# Patient Record
Sex: MALE
[De-identification: names, ages, dates, MRNs, and addresses within clinical notes are randomized per-mention and may not be internally consistent; named-entity substitution may affect disease eponyms.]

---
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
## 2021-01-04 ENCOUNTER — HOSPITAL ENCOUNTER (OUTPATIENT)
Dept: PHYSICAL THERAPY | Facility: CLINIC | Age: 21
Setting detail: THERAPIES SERIES
Discharge: HOME | End: 2021-01-04
Payer: COMMERCIAL

## 2021-01-04 PROCEDURE — 97110 THERAPEUTIC EXERCISES: CPT

## 2021-01-04 PROCEDURE — 97161 PT EVAL LOW COMPLEX 20 MIN: CPT

## 2021-01-04 PROCEDURE — 97140 MANUAL THERAPY 1/> REGIONS: CPT
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
## 2021-01-13 ENCOUNTER — HOSPITAL ENCOUNTER (OUTPATIENT)
Dept: PHYSICAL THERAPY | Facility: CLINIC | Age: 21
Setting detail: THERAPIES SERIES
Discharge: HOME | End: 2021-01-13
Payer: COMMERCIAL

## 2021-01-13 PROCEDURE — 97110 THERAPEUTIC EXERCISES: CPT

## 2021-01-13 PROCEDURE — 97140 MANUAL THERAPY 1/> REGIONS: CPT

## 2021-01-13 NOTE — FLOWSHEET NOTE
[] [facility]) [facility] &  Therapy  [address]  [phone]  F: [phone] [] [address]  [facility]   [address]  P: [phone]  F: [phone] [x] [address] &  Therapy  [address] [facility] [address]  P: [phone]  F: [phone] [] [address]  P: [phone]  F: [phone] [] [address]  [facility]   [address]   [place]: [phone]  F: [phone]      Physical Therapy Daily Treatment Note    Date:  2021  Patient Name:  Oskar Bentley    :  2000  MRN: [id number]  Physician: Amilcar Keenan MD                        Insurance: [employer]  Medical Diagnosis: R Wrist/hand pain                     Rehab Codes: M25.539; M79.643, M25.531, M79.641  Onset Date: 20             Next 's appt: prn  Visit# / total visits: ; Progress note for Medicare patient due at visit 10     Cancels/No Shows: 0    Subjective:    Pain:  [x] Yes  [] No Location: R wrist Pain Rating: (0-10 scale) 2/10  Pain altered Tx:  [] No  [x] Yes  Action: See below  Comments: Pt reports pain is going down. Still hurts when he tries to pronate/supinate all the way. Objective:  Modalities: Manual therapy:25 min: mobs R wrist, R-U, carpals, MFR to R wrist soft tissue, FCU, UCL, with DN to the same. Precautions:  Exercises: See PTA note  Exercise Reps/ Time Weight/ Level Comments                                 Other:      Treatment Charges: Mins Units   []  Modalities     []  Ther Exercise     [x]  Manual Therapy 2 25   []  Ther Activities     []  Aquatics     []  Vasocompression     []  Other     Total Treatment time 2 25       Assessment: [x] Progressing toward goals. Reduced pain noted. See PTA note for ex     [] No change.      [] Other:  [] Patient would continue to benefit from skilled physical therapy services in order to: improve pain levels, improve ROM and strength to return to sports/weight lifting. STG: (to be met in 6-8 treatments)  1. ? Pain: to minimal  2. ? ROM: by 5 degrees or more  3. ? Strength:by 1/2 m. grade? Function:  4. Patient to be independent with home exercise program as demonstrated by performance with correct form without cues.     LTG: (to be met in 12+ treatments)  1. No pain with return to sports, lifting  2. R wrist ROM = to L wrist  3. Wrist strength R = L                    Patient goals: Be able to lift soon    Pt. Education:  [x] Yes  [] No  [] Reviewed Prior HEP/Ed  Method of Education: [x] Verbal  [x] Demo  [] Written  Comprehension of Education:  [x] Verbalizes understanding. [x] Demonstrates understanding. [] Needs review. [] Demonstrates/verbalizes HEP/Ed previously given. Plan: [] Continue current frequency toward long and short term goals.         Time In:1005          Time Out: 1030    Electronically signed by:  Tamika Hardin PT

## 2021-01-13 NOTE — FLOWSHEET NOTE
[] [facility]  [facility]  [address]  [phone]  F: [phone] [] [address]  [facility]   [address]  P: [phone]  F: [phone] [x] [address]   Outpatient Rehabilitation &  Therapy  [address]  P: [phone]  F: [phone] [] [address]  P: [phone]  F: [phone] [] [address]  [facility]   [address]   [place]: [phone]  F: [phone]      Physical Therapy Daily Treatment Note    Date:  2021  Patient Name:  Maricruz Zavala    :  2000  MRN: [id number]   Physician: Kena Boo MD                        Insurance: [employer]  Medical Diagnosis: R Wrist/hand pain                     Rehab Codes: M25.539; M79.643, M25.531, M79.641  Onset Date: 20             Next 's appt: prn  Visit# / total visits: 2/12 (2-3x wk)     Cancels/No Shows:    Subjective:    Pain:  [x] Yes  [] No Location: R wrist/hand Pain Rating: (0-10 scale) 2/10  Pain altered Tx:  [] No  [] Yes  Action:  Comments: Pt states most discomfort is supination. Objective:  Modalities: Christina Dwaine) Manual therapy:25 min: mobs R wrist, R-U, carpals, MFR to R wrist soft tissue, FCU, UCL, with DN to the same.   Precautions:  Exercises:  Exercise Reps/ Time Weight/ Level Comments    Wrist extension stretch 2 5 HEP with HO x   Gentle radial deviation stretch 2 5 HEP with HO x   Finger abd to closed fist 2 5 HEP with HO x   Pron/sup stretch 2 5 HEP with HO x              Wrist ext 2# 10  x   Radial deviation  2# 10  x   Wrist flexion  2# 10 Into neutral only, half curl.  x   Towel twist  10  x   Bicep curl 2#  In neutral only x   Putty  squeeze  2'  Orange (in drawer with name) x                        Other:     Specific Instructions for next treatment: Manual as above and as tolerated with ex progression as tolerated. Treatment Charges: Mins Units   [x]  Modalities: CP 10 0   [x]  Ther Exercise 25 2   []  Manual Therapy     []  Ther Activities     []  Aquatics     []  Vasocompression     []  Other     Total Treatment time 35 2       Assessment: [x] Progressing toward goals. Initiated tx with gentle stretches and cues to maintain in pain free range as to not increase noted inflammation while avoiding full supination. Pt marylou above ex well with cues and assist in maintaining RUE stable to decrease shoulder compensation. No increase in pain at end of tx but added CP for 10' to address and decrease noted inflammation and was instructed to continue ice as PRN at home. Continue with ex listed above and progress in pain free range. [] No change. [] Other:  [] Patient would continue to benefit from skilled physical therapy services in order to resume prior activities. STG: (to be met in 6-8 treatments)  1. ? Pain: to minimal  2. ? ROM: by 5 degrees or more  3. ? Strength:by 1/2 m. grade? Function:  4. Patient to be independent with home exercise program as demonstrated by performance with correct form without cues.     LTG: (to be met in 12+ treatments)  1. No pain with return to sports, lifting  2. R wrist ROM = to L wrist  3. Wrist strength R = L                    Patient goals: Be able to lift soon    Pt. Education:  [x] Yes  [] No  [] Reviewed Prior HEP/Ed  Method of Education: [x] Verbal  [x] Demo  [] Written  Comprehension of Education:  [x] Verbalizes understanding. [x] Demonstrates understanding. [] Needs review. [] Demonstrates/verbalizes HEP/Ed previously given. Plan: [x] Continue current frequency toward long and short term goals.     [] Specific Instructions for subsequent treatments:       Time In: 10:31           Time Out: 11:06    Electronically signed by:  Govind Yao PTA

## 2021-01-18 ENCOUNTER — HOSPITAL ENCOUNTER (OUTPATIENT)
Dept: PHYSICAL THERAPY | Facility: CLINIC | Age: 21
Setting detail: THERAPIES SERIES
Discharge: HOME | End: 2021-01-18
Payer: COMMERCIAL

## 2021-01-18 PROCEDURE — 97110 THERAPEUTIC EXERCISES: CPT

## 2021-01-18 PROCEDURE — 97140 MANUAL THERAPY 1/> REGIONS: CPT

## 2021-01-18 NOTE — FLOWSHEET NOTE
[] [facility] &  Therapy  [address]  [phone]  F: [phone] [] [address]  [employer] [address]   [address]  P: [phone]  F: [phone] [x] [address]   Outpatient Rehabilitation &  Therapy  [address]  P: [phone]  F: [phone] [] [address]  P: [phone]  F: [phone] [] [address]  [facility]   [address]   [place]: [phone]  F: [phone]      Physical Therapy Daily Treatment Note    Date:  2021  Patient Name:  Keagan Kwok    :  2000  MRN: [id number]   Physician: Yuri Huizar MD                        Insurance: [employer]  Medical Diagnosis: R Wrist/hand pain                     Rehab Codes: M25.539; M79.643, M25.531, M79.641  Onset Date: 20             Next 's appt: prn  Visit# / total visits: / (2-3x wk)     Cancels/No Shows:    Subjective:    Pain:  [x] Yes  [] No Location: R wrist/hand Pain Rating: (0-10 scale) 1/10  Pain altered Tx:  [] No  [] Yes  Action:  Comments: Pt states his wrist is getting better. Able to tolerate more movement without pain. Objective:  Modalities: Manual therapy:10 min: mobs R wrist, R-U, carpals, MFR to R wrist soft tissue, FCU, UCL, with DN to the same.   Precautions:  Exercises:  Exercise Reps/ Time Weight/ Level Comments    UBE 10 miin   x   Wrist extension stretch 2 5 HEP with HO x   Gentle radial deviation stretch 2 5 HEP with HO x   Finger abd to closed fist 2 5 HEP with HO x   Pron/sup stretch 2 5 HEP with HO x              Wrist ext 2# 2/10  x   Radial deviation  2# 2/10  x   Wrist flexion  2# 2/10 Into neutral only, half curl.  x   Towel twist  10  x   Bicep curl   In neutral only    Putty  squeeze  2'  Orange (in drawer with name) x Wrist flex/ext with roll up strap 3   x   Supination with TB 15 purple  x   TB -UE horiz abd 15 purple  x   TB - UE flex 15 purple  x   TB UE lat pull down 15 purple  x   Pron with TB  next      Pron/sup  20 2 rings tool x   Baps screws cw/ccw 1ea   x   Clothes pins 3 min   x   Ball walk outs 3x   x   Ball toss 12 1.5/3.5 Single/double x          Other:     Specific Instructions for next treatment: Manual as above and as tolerated with ex progression as tolerated. Treatment Charges: Mins Units   [x]  Modalities: CP 10 0   [x]  Ther Exercise 40 3   [x]  Manual Therapy 10 1   []  Ther Activities     []  Aquatics     []  Vasocompression     []  Other     Total Treatment time 60 4       Assessment: [x] Progressing toward goals. Added all ex per log with good tolerance, except slight pain with ball toss and walk out a few times, but then got better. Pt marylou above ex well with cues and assist in maintaining RUE stable to decrease shoulder compensation. No increase in pain at end of tx but added CP for 10' to address and decrease noted inflammation and was instructed to continue ice as PRN at home. Continue with ex listed above and progress in pain free range. [] No change. [] Other:  [] Patient would continue to benefit from skilled physical therapy services in order to resume prior activities. STG: (to be met in 6-8 treatments)  1. ? Pain: to minimal  2. ? ROM: by 5 degrees or more  3. ? Strength:by 1/2 m. grade? Function:  4. Patient to be independent with home exercise program as demonstrated by performance with correct form without cues.     LTG: (to be met in 12+ treatments)  1. No pain with return to sports, lifting  2. R wrist ROM = to L wrist  3. Wrist strength R = L                    Patient goals: Be able to lift soon    Pt. Education:  [x] Yes  [] No  [] Reviewed Prior HEP/Ed  Method of Education: [x] Verbal  [x] Demo  [] Written  Comprehension of Education:  [x] Verbalizes understanding.   [x]

## 2021-01-21 ENCOUNTER — HOSPITAL ENCOUNTER (OUTPATIENT)
Dept: PHYSICAL THERAPY | Facility: CLINIC | Age: 21
Setting detail: THERAPIES SERIES
Discharge: HOME | End: 2021-01-21
Payer: COMMERCIAL

## 2021-01-21 PROCEDURE — 97140 MANUAL THERAPY 1/> REGIONS: CPT

## 2021-01-21 PROCEDURE — 97110 THERAPEUTIC EXERCISES: CPT

## 2021-01-21 NOTE — FLOWSHEET NOTE
[] [facility]) [facility] &  Therapy  [address]  [phone]  F: [phone] [] [address]  [employer] [address]   [address]  P: [phone]  F: [phone] [x] [address] &  Therapy  [address] [facility]  P: [phone]  F: [phone] [] [address]  P: [phone]  F: [phone] [] [address]  [facility]   [address]   [place]: [phone]  F: [phone]      Physical Therapy Daily Treatment Note    Date:  2021  Patient Name:  Leroy Mallory    :  2000  MRN: [id number]   Physician: Arturo Figueroa MD                        Insurance: [facility] Medicaid  Medical Diagnosis: R Wrist/hand pain                     Rehab Codes: M25.539; M79.643, M25.531, M79.641  Onset Date: 20             Next 's appt: prn  Visit# / total visits:  (2-3x wk)     Cancels/No Shows:    Subjective:    Pain:  [x] Yes  [] No Location: R wrist/hand Pain Rating: (0-10 scale) 1-2/10  Pain altered Tx:  [] No  [] Yes  Action:  Comments: Pt states his wrist is getting better. Able to tolerate more movement without pain. Objective:  Modalities: Manual therapy:10 min: mobs R wrist, R-U, carpals, MFR to R wrist soft tissue, FCU, UCL, with DN to the same. Precautions:  Exercises: 50  Exercise Reps/ Time Weight/ Level Comments    UBE 10 miin   x   Wrist extension stretch 2 5 HEP with HO x   Gentle radial deviation stretch 2 5 HEP with HO x   Finger abd to closed fist 2 5 HEP with HO x   Pron/sup stretch 2 5 HEP with HO x              Wrist ext 2# 2/10     Radial deviation  2# 2/10     Wrist flexion  2# 2/10 Into neutral only, half curl.      Towel twist  10     Bicep curl   In neutral only    Putty  squeeze  2'  Orange (in drawer with name) - given for HEP 1/21 x   Wrist flex/ext with roll up rope 3 2.5#  x   Supination with TB 1/10 blue  x   TB -UE horiz abd 2/10 purple  x   TB - UE flex 2/10 purple  x   TB sh ER 10 blue  x   TB UE lat pull down 2/10 purple  x   Pron with TB  1/10 blue  x   Pron/sup  20 2 rings tool x   Baps screws cw/ccw 1ea      Clothes pins 3 min      Ball walk outs 3x   x   Ball toss 15 1.5/4.5 Single/double x   I Y T EXT 2/12 5  x   Shoulder IR pulley system 15 15 Pulley wt x   Bicep curls pulley system 15 15  x   Tricep pulley sys 15 45  x   Lat pull down machine 15 40  x                                      Other:     Specific Instructions for next treatment: Manual as above and as tolerated with ex progression as tolerated. Treatment Charges: Mins Units   [x]  Modalities: CP 10 0   [x]  Ther Exercise 50 3   [x]  Manual Therapy 10 1   []  Ther Activities     []  Aquatics     []  Vasocompression     []  Other     Total Treatment time 70 4       Assessment: [x] Progressing toward goals. Added all ex per log with good tolerance, except slight pain with bicep curls, but adjusted  which was helpful. Pt marylou above ex well with cues and assist in maintaining RUE stable to decrease shoulder compensation. No increase in pain at end of tx but added CP for 10' to address and decrease noted inflammation and was instructed to continue ice as PRN at home. Continue with ex listed above and progress in pain free range. [] No change. [] Other:  [] Patient would continue to benefit from skilled physical therapy services in order to resume prior activities. STG: (to be met in 6-8 treatments)  1. ? Pain: to minimal  2. ? ROM: by 5 degrees or more  3. ? Strength:by 1/2 m. grade? Function:  4. Patient to be independent with home exercise program as demonstrated by performance with correct form without cues.     LTG: (to be met in 12+ treatments)  1. No pain with return to sports, lifting  2. R wrist ROM = to L wrist  3.  Wrist strength R = L                    Patient goals: Be able to lift soon    Pt. Education:  [x] Yes  [] No  [] Reviewed Prior HEP/Ed  Method of Education: [x] Verbal  [x] Demo  [] Written  Comprehension of Education:  [x] Verbalizes understanding. [x] Demonstrates understanding. [] Needs review. [] Demonstrates/verbalizes HEP/Ed previously given. Plan: [x] Continue current frequency toward long and short term goals.     [] Specific Instructions for subsequent treatments:       Time In: 0930          Time Out: [address]    Electronically signed by:  Kelvin Mera PT

## 2021-01-25 ENCOUNTER — HOSPITAL ENCOUNTER (OUTPATIENT)
Dept: PHYSICAL THERAPY | Facility: CLINIC | Age: 21
Setting detail: THERAPIES SERIES
Discharge: HOME | End: 2021-01-25
Payer: COMMERCIAL

## 2021-01-25 PROCEDURE — 97140 MANUAL THERAPY 1/> REGIONS: CPT

## 2021-01-25 PROCEDURE — 97110 THERAPEUTIC EXERCISES: CPT

## 2021-01-25 NOTE — FLOWSHEET NOTE
[] [facility]) [facility] &  Therapy  [address]  [phone]  F: [phone] [] [address]  [employer] [address]   [address]  P: [phone]  F: [phone] [x] [address] &  Therapy  [address]  P: [phone]  F: [phone] [] [address]  P: [phone]  F: [phone] [] [address]  [facility]   [address]   [place]: [phone]  F: [phone]      Physical Therapy Daily Treatment Note    Date:  2021  Patient Name:  Rachel Hastings    :  2000  MRN: [id number]   Physician: Cindy Patterson MD                        Insurance: [employer]  Medical Diagnosis: R Wrist/hand pain                     Rehab Codes: M25.539; M79.643, M25.531, M79.641  Onset Date: 20             Next 's appt: prn  Visit# / total visits:  (2-3x wk)     Cancels/No Shows:    Subjective:  Pain altered Tx:  [] No  [] Yes  Action:  Pain:  [x] Yes  [] No Location: R wrist/hand Pain Rating: (0-10 scale) 1-2/10    Comments: Pt states his wrist is continuing to get better. Pain is still there when trying to go into full ROM (supination), however. Objective:  Modalities: Manual therapy:25 min: mobs R wrist, R-U, carpals, MFR to R wrist soft tissue, FCU, UCL, with DN to the same. Precautions:  Exercises: See PTA note  Exercise Reps/ Time Weight/ Level Comments    UBE 10 miin   x   Wrist extension stretch 2 5 HEP with HO x   Gentle radial deviation stretch 2 5 HEP with HO x   Finger abd to closed fist 2 5 HEP with HO x   Pron/sup stretch 2 5 HEP with HO x              Wrist ext 2# 2/10     Radial deviation  2# 2/10     Wrist flexion  2# 2/10 Into neutral only, half curl.      Towel twist  10     Bicep curl   In neutral only Putty  squeeze  2'  Orange (in drawer with name) - given for HEP 1/21 x   Wrist flex/ext with roll up rope 3 2.5#  x   Supination with TB 1/10 blue  x   TB -UE horiz abd 2/10 purple  x   TB - UE flex 2/10 purple  x   TB sh ER 10 blue  x   TB UE lat pull down 2/10 purple  x   Pron with TB  1/10 blue  x   Pron/sup  20 2 rings tool x   Baps screws cw/ccw 1ea      Clothes pins 3 min      Ball walk outs 3x   x   Ball toss 15 1.5/4.5 Single/double x   I Y T EXT 2/12 5  x   Shoulder IR pulley system 15 15 Pulley wt x   Bicep curls pulley system 15 15  x   Tricep pulley sys 15 45  x   Lat pull down machine 15 40  x                                      Other:     Specific Instructions for next treatment: Manual as above and as tolerated with ex progression as tolerated. Treatment Charges: Mins Units   []  Modalities: CP     []  Ther Exercise     [x]  Manual Therapy 25 2   []  Ther Activities     []  Aquatics     []  Vasocompression     []  Other     Total Treatment time 25 2       Assessment: [x] Progressing toward goals. Pain noted with full pronation, however other ROM improving. Pain noted today in a new location between the distal ulna and radius dorsally. See PTA note for ex. [] No change. [] Other:  [] Patient would continue to benefit from skilled physical therapy services in order to resume prior activities. STG: (to be met in 6-8 treatments)  1. ? Pain: to minimal  2. ? ROM: by 5 degrees or more  3. ? Strength:by 1/2 m. grade? Function:  4. Patient to be independent with home exercise program as demonstrated by performance with correct form without cues.     LTG: (to be met in 12+ treatments)  1. No pain with return to sports, lifting  2. R wrist ROM = to L wrist  3. Wrist strength R = L                    Patient goals: Be able to lift soon    Pt.  Education:  [x] Yes  [] No  [] Reviewed Prior HEP/Ed  Method of Education: [x] Verbal  [x] Demo  [] Written  Comprehension of Education:  [x] [employer] understanding. [x] Demonstrates understanding. [] Needs review. [] Demonstrates/verbalizes HEP/Ed previously given. Plan: [x] Continue current frequency toward long and short term goals.     [] Specific Instructions for subsequent treatments:       Time In: 1005          Time Out: 1030    Electronically signed by:  Enrico Robins PT

## 2021-01-25 NOTE — FLOWSHEET NOTE
[] [facility] &  Therapy  [address]  [phone]  F: [phone] [] [address]  [employer] [address]   [address]  P: [phone]  F: [phone] [x] [address] &  Therapy  [address]  P: [phone]  F: [phone] [] [address]  P: [phone]  F: [phone] [] [address]  [facility]   [address]   [place]: [phone]  F: [phone]      Physical Therapy Daily Treatment Note    Date:  2021  Patient Name:  eSan Camacho    :  2000  MRN: [id number]   Physician: Chong Corona MD                        Insurance: [employer]  Medical Diagnosis: R Wrist/hand pain                     Rehab Codes: M25.539; M79.643, M25.531, M79.641  Onset Date: 20             Next 's appt: prn  Visit# / total visits:  (2-3x wk)     Cancels/No Shows:    Subjective:    Pain:  [x] Yes  [] No Location: R wrist/hand Pain Rating: (0-10 scale) 1-2/10  Pain altered Tx:  [] No  [] Yes  Action:  Comments: Pt arrived reporting R writs/hand is feeling better after needles. Objective:  Modalities: Manual therapy:10 min: mobs R wrist, R-U, carpals, MFR to R wrist soft tissue, FCU, UCL, with DN to the same. Precautions:  Exercises: 50  Exercise Reps/ Time Weight/ Level Comments    UBE 10 miin   x   Wrist extension stretch 2 5 HEP with HO x   Gentle radial deviation stretch 2 5 HEP with HO x   Finger abd to closed fist 2 5 HEP with HO x   Pron/sup stretch 2 5 HEP with HO x              Wrist ext 2# 2/10     Radial deviation  2# 2/10     Wrist flexion  2# 2/10 Into neutral only, half curl.      Towel twist  10     Bicep curl   In neutral only    Putty  squeeze  2'  Orange (in drawer with name) - given for HEP  Wrist flex/ext with roll up rope 3 2.5#  x   Supination with TB 2/10 blue  x   TB -UE horiz abd 3/10 purple  x   TB - UE flex 3/10 purple  x   TB sh ER 2/10 blue  x   TB UE lat pull down 3/10 purple  x   Pron with TB  2/10 blue  x   Pron/sup  20 2 rings tool x   Baps screws cw/ccw 1ea      Clothes pins 3 min      Ball walk outs 3x      Ball toss 15 1.5/4.5 Single/double    I Y T EXT 2/12 5  x   Shoulder IR pulley system 15 15 Pulley wt    Bicep curls pulley system 15 15     Tricep pulley sys 15 45     Lat pull down machine 15 40  x                                      Other:     Specific Instructions for next treatment: Manual as above and as tolerated with ex progression as tolerated. Treatment Charges: Mins Units   []  Modalities: CP     [x]  Ther Exercise 25 2   []  Manual Therapy     []  Ther Activities     []  Aquatics     []  Vasocompression     []  Other     Total Treatment time 25 2       Assessment: [x] Progressing toward goals. Able to progress pt with increasing reps for exercises as stated above with good tolerance. Pt having increase muscle fatigue with increased reps, but no increase in pain. Pt displayed good form throughout treatment this date. Continue to progress as marylou. [] No change. [] Other:  [] Patient would continue to benefit from skilled physical therapy services in order to resume prior activities. STG: (to be met in 6-8 treatments)  1. ? Pain: to minimal  2. ? ROM: by 5 degrees or more  3. ? Strength:by 1/2 m. grade? Function:  4. Patient to be independent with home exercise program as demonstrated by performance with correct form without cues.     LTG: (to be met in 12+ treatments)  1. No pain with return to sports, lifting  2. R wrist ROM = to L wrist  3. Wrist strength R = L                    Patient goals: Be able to lift soon    Pt.  Education:  [x] Yes  [] No  [] Reviewed Prior HEP/Ed  Method of Education: [x] Verbal  [x] Demo  [] Written  Comprehension of Education:  [x] Verbalizes understanding. [x] Demonstrates understanding. [] Needs review. [] Demonstrates/verbalizes HEP/Ed previously given. Plan: [x] Continue current frequency toward long and short term goals.     [] Specific Instructions for subsequent treatments:       Time In: 10:25 am          Time Out: 11:00 am    Electronically signed by:  Jim Cat PTA

## 2021-02-02 ENCOUNTER — HOSPITAL ENCOUNTER (OUTPATIENT)
Dept: PHYSICAL THERAPY | Facility: CLINIC | Age: 21
Setting detail: THERAPIES SERIES
Discharge: HOME | End: 2021-02-02
Payer: COMMERCIAL

## 2021-02-02 PROCEDURE — 97140 MANUAL THERAPY 1/> REGIONS: CPT

## 2021-02-02 PROCEDURE — 97110 THERAPEUTIC EXERCISES: CPT

## 2021-02-02 NOTE — FLOWSHEET NOTE
twist  10     Bicep curl   In neutral only    Putty  squeeze  2'  Orange (in drawer with name) - given for HEP 1/21    Wrist flex/ext with roll up rope 3 2.5#     Supination with TB 2/10 blue  x   TB -UE horiz abd 2/10 purple  x   TB - UE flex 2/10 purple  x   TB sh ER 2/10 blue  x   TB UE lat pull down 2/10 purple  x   Pron with TB  1/10 blue  x   Pron/sup  20 2 rings tool x   Baps screws cw/ccw 1ea      Clothes pins 3 min      Ball walk outs 3x      Ball toss 20 3.5/4.5 Single/double x   I Y T EXT 2/15 5  x   Shoulder IR pulley system 15 15 Pulley wt    Bicep curls pulley system 15 15     Tricep pulley sys 15 45     Lat pull down machine 15 40                                        Other:     Specific Instructions for next treatment: Manual as above and as tolerated with ex progression as tolerated. Treatment Charges: Mins Units   [x]  Modalities: CP 10 NC   [x]  Ther Exercise 30 2   [x]  Manual Therapy 25 2   []  Ther Activities     []  Aquatics     []  Vasocompression     [x]  Other     Total Treatment time 65 4       Assessment: [x] Progressing toward goals. Some pain noted with catching the 3.5 ball with one hand and continued with full range flexion in a supinated position at the as he had in his previous session. Mobs and ice helpful for reducing soreness post-treatment. [] No change. [] Other:  [] Patient would continue to benefit from skilled physical therapy services in order to resume prior activities. STG: (to be met in 6-8 treatments)  1. ? Pain: to minimal  2. ? ROM: by 5 degrees or more  3. ? Strength:by 1/2 m. grade? Function:  4. Patient to be independent with home exercise program as demonstrated by performance with correct form without cues.     LTG: (to be met in 12+ treatments)  1. No pain with return to sports, lifting  2. R wrist ROM = to L wrist  3. Wrist strength R = L                    Patient goals: Be able to lift soon    Pt.  Education:  [x] Yes  [] No  [] Reviewed Prior HEP/Ed  Method of Education: [x] Verbal  [x] Demo  [] Written  Comprehension of Education:  [x] Verbalizes understanding. [x] Demonstrates understanding. [] Needs review. [] Demonstrates/verbalizes HEP/Ed previously given. Plan: [x] Continue current frequency toward long and short term goals.     [] Specific Instructions for subsequent treatments:       Time In: 1100          Time Out: 1210    Electronically signed by:  Shari Miller PT

## 2021-02-09 ENCOUNTER — HOSPITAL ENCOUNTER (OUTPATIENT)
Dept: PHYSICAL THERAPY | Facility: CLINIC | Age: 21
Setting detail: THERAPIES SERIES
Discharge: HOME | End: 2021-02-09
Payer: COMMERCIAL

## 2021-02-09 PROCEDURE — 97140 MANUAL THERAPY 1/> REGIONS: CPT

## 2021-02-09 PROCEDURE — 97110 THERAPEUTIC EXERCISES: CPT

## 2021-02-09 NOTE — FLOWSHEET NOTE
[] [facility] &  Therapy  [address]  [phone]  F: [phone] [] [address]  [facility] [place] [facility]   [address]  P: [phone]  F: [phone] [x] [address]   Outpatient Rehabilitation &  Therapy  [address] [facility]  P: [phone]  F: [phone] [] [address]  P: [phone]  F: [phone] [] [address]  [facility]   [address]   [place]: [phone]  F: [phone]      Physical Therapy Daily Treatment Note    Date:  2021  Patient Name:  Willy Villatoro    :  2000  MRN: [id number]   Physician: Larry Grayson MD                        Insurance: [employer]  Medical Diagnosis: R Wrist/hand pain                     Rehab Codes: M25.539; M79.643, M25.531, M79.641  Onset Date: 20             Next 's appt: prn  Visit# / total visits:  (2-3x wk)     Cancels/No Shows: 0    Subjective:  Pain altered Tx:  [] No  [x] Yes  Action:See below  Pain:  [x] Yes  [] No Location: R wrist/hand Pain Rating: (0-10 scale) 0-1/10    Comments: Pt reporting no pain at rest, but when performing supination has some discomfort.  Feels good after manual.      Objective:  Modalities: Manual therapy:25 min: mobs R wrist, R-U, carpals, MFR to R wrist soft tissue, FCU, UCL, with DN to the same, wrist mobs at ulna/rad, triquetrium/lunate  Precautions:  Exercises:   Exercise Reps/ Time Weight/ Level Comments    bike 6 miin   x   Wrist extension stretch 2 5 HEP with HO x   Gentle radial deviation stretch 2 5 HEP with HO x   Finger abd to closed fist 2 5 HEP with HO x   Pron/sup stretch 2 5 HEP with HO x              Wrist ext 3# 2/10  x   Radial deviation  3# 2/10  x   Wrist flexion  3# 2/10 Into neutral only, half curl.  x   Towel twist  10     Bicep curl   In neutral only    Putty  squeeze  2'  Orange (in drawer with name) - given for HEP 1/21    Wrist flex/ext with roll up rope 3 2.5#     Supination with TB 2/10 purple  x   TB -UE horiz abd 2/10 purple  x   TB - UE flex 2/10 purple  x   TB sh ER 2/10 purple  x   TB UE lat pull down 2/10 purple  x   Pron with TB  1/10 purple  x   Pron/sup  30 2 rings tool x   Baps screws cw/ccw 1ea      Clothes pins 3 min      Ball walk outs 3x      Ball toss 20 3.5/4.5 Single/double x   I Y T EXT 2/15 5  x   Shoulder IR pulley system 15 15 Pulley wt    Bicep curls pulley system 15 15     Tricep pulley sys 15 45     Lat pull down machine 15 40                                        Other:     Specific Instructions for next treatment: Manual as above and as tolerated with ex progression as tolerated. Treatment Charges: Mins Units   [x]  Modalities: CP 10 NC   [x]  Ther Exercise 30 2   []  Manual Therapy     []  Ther Activities     []  Aquatics     []  Vasocompression     []  Other     Total Treatment time         Assessment: [x] Progressing toward goals. Able to increase reps for exercises as well as increase resistance to purple for all exercises. No increase in pain with exercises just having muscle fatigue post treatment. Ended with CP for decreased symptoms. [] No change. [] Other:  [] Patient would continue to benefit from skilled physical therapy services in order to resume prior activities. STG: (to be met in 6-8 treatments)  1. ? Pain: to minimal  2. ? ROM: by 5 degrees or more  3. ? Strength:by 1/2 m. grade? Function:  4. Patient to be independent with home exercise program as demonstrated by performance with correct form without cues.     LTG: (to be met in 12+ treatments)  1. No pain with return to sports, lifting  2. R wrist ROM = to L wrist  3. Wrist strength R = L                    Patient goals: Be able to lift soon    Pt.  Education:  [x] Yes  [] No  [] Reviewed Prior HEP/Ed  Method of Education:

## 2021-02-09 NOTE — FLOWSHEET NOTE
x   Radial deviation  3# 1/10  x   Wrist flexion  3# 1/10 Into neutral only, half curl.  x   Towel twist  10     Bicep curl   In neutral only    Putty  squeeze  2'  Orange (in drawer with name) - given for HEP 1/21    Wrist flex/ext with roll up rope 3 2.5#     Supination with TB 2/10 blue  x   TB -UE horiz abd 2/10 purple  x   TB - UE flex 2/10 purple  x   TB sh ER 2/10 blue  x   TB UE lat pull down 2/10 purple  x   Pron with TB  1/10 blue  x   Pron/sup  20 2 rings tool x   Baps screws cw/ccw 1ea      Clothes pins 3 min      Ball walk outs 3x      Ball toss 20 3.5/4.5 Single/double x   I Y T EXT 2/15 5  x   Shoulder IR pulley system 15 15 Pulley wt    Bicep curls pulley system 15 15     Tricep pulley sys 15 45     Lat pull down machine 15 40                                        Other:     Specific Instructions for next treatment: Manual as above and as tolerated with ex progression as tolerated. Treatment Charges: Mins Units   []  Modalities: CP     []  Ther Exercise     [x]  Manual Therapy 25 2   []  Ther Activities     []  Aquatics     []  Vasocompression     []  Other     Total Treatment time 25 2       Assessment: [x] Progressing toward goals. Pt continues to report improvement, but still having trouble with full sup/pron and now ulnar deviation. Manual therapy helpful for improving motion and relieving pain. [] No change. [] Other:  [] Patient would continue to benefit from skilled physical therapy services in order to resume prior activities. STG: (to be met in 6-8 treatments)  1. ? Pain: to minimal  2. ? ROM: by 5 degrees or more  3. ? Strength:by 1/2 m. grade? Function:  4. Patient to be independent with home exercise program as demonstrated by performance with correct form without cues.     LTG: (to be met in 12+ treatments)  1. No pain with return to sports, lifting  2. R wrist ROM = to L wrist  3. Wrist strength R = L                    Patient goals: Be able to lift soon    Pt. Education:  [x] Yes  [] No  [] Reviewed Prior HEP/Ed  Method of Education: [x] Verbal  [x] Demo  [] Written  Comprehension of Education:  [x] Verbalizes understanding. [x] Demonstrates understanding. [] Needs review. [] Demonstrates/verbalizes HEP/Ed previously given. Plan: [x] Continue current frequency toward long and short term goals.     [] Specific Instructions for subsequent treatments:       Time In: 1030          Time Out: 1100    Electronically signed by:  Mis Cooper PT

## 2021-02-15 NOTE — FLOWSHEET NOTE
[] [address]  [address]    [phone]  F: [phone]   [] [address]  [facility] 36   [address]  P: [phone]  F: [phone]  [] [address]  [address] [facility]  P: [phone]  F: [phone] [] [address]  P: [phone]  F: [phone]  [] [address]  [address] [facility] [address]   [address]   [place]: [phone]  F: [phone]   [] [address]  [place]: [phone]   F: [phone]     Physical Therapy Cancel/No Show note    Date: 2/15/2021  Patient: Radha Lopez  : 2000  MRN: [id number]    Cancels/No Shows to date: 1  For today's appointment patient:    [x]  Cancelled    [] Rescheduled appointment    [] No-show     Reason given by patient:    []  Patient ill    []  Conflicting appointment    [] No transportation      [] Conflict with work    [] No reason given    [x] Weather related    [] COVID-19    [] Other:      Comments:        [] Next appointment was confirmed    Electronically signed by: Marquis Patel

## 2021-02-16 ENCOUNTER — HOSPITAL ENCOUNTER (OUTPATIENT)
Dept: PHYSICAL THERAPY | Facility: CLINIC | Age: 21
Setting detail: THERAPIES SERIES
Discharge: HOME | End: 2021-02-16
Payer: COMMERCIAL

## 2021-02-23 ENCOUNTER — HOSPITAL ENCOUNTER (OUTPATIENT)
Dept: PHYSICAL THERAPY | Facility: CLINIC | Age: 21
Setting detail: THERAPIES SERIES
Discharge: HOME | End: 2021-02-23
Payer: COMMERCIAL

## 2021-02-23 PROCEDURE — 97140 MANUAL THERAPY 1/> REGIONS: CPT

## 2021-02-23 PROCEDURE — 97110 THERAPEUTIC EXERCISES: CPT

## 2021-02-23 NOTE — FLOWSHEET NOTE
[] [facility]) [facility] &  Therapy  [address]  [phone]  F: [phone] [] [address]  [facility] [address]   [address]  P: [phone]  F: [phone] [x] [address] &  Therapy  [address] [facility]  P: [phone]  F: [phone] [] [address]  P: [phone]  F: [phone] [] [address]  [facility]   [address]   [place]: [phone]  F: [phone]      Physical Therapy Daily Treatment Note    Date:  2021  Patient Name:  Zulma Varghese    :  2000  MRN: [id number]   Physician: Loretta Hernandez MD                        Insurance: [facility] Medicaid  Medical Diagnosis: R Wrist/hand pain                     Rehab Codes: M25.539; M79.643, M25.531, M79.641  Onset Date: 20             Next 's appt: prn  Visit# / total visits:  (2-3x wk)     Cancels/No Shows: 0    Subjective:  Pain altered Tx:  [] No  [x] Yes  Action:See below  Pain:  [x] Yes  [] No Location: R wrist/hand Pain Rating: (0-10 scale) 0/10    Comments: Pt reports he is getting better, and noticing improved ROM overall. Only slight soreness at end range supination with wrist flex. Getting a little lifting in, but going very gradual with it.      Objective:  Modalities: Manual therapy:20 min: mobs R wrist, R-U, carpals, MFR to R wrist soft tissue, FCU, UCL, with DN to the same, wrist mobs at ulna/rad, triquetrium/lunate TFCC, DRUL  Precautions:  Exercises: SEE PTA note  Exercise Reps/ Time Weight/ Level Comments    UBE 10 miin   x   Wrist extension stretch 2 5 HEP with HO x   Gentle radial deviation stretch 2 5 HEP with HO x   Finger abd to closed fist 2 5 HEP with HO x   Pron/sup stretch 2 5 HEP with HO x              Wrist ext 3# 1/10  x   Radial deviation  3# 1/10  x   Wrist flexion  3# 1/10 Into neutral only, half curl.  x   Towel twist  10     Bicep curl   In neutral only    Putty  squeeze  HEP  Orange (in drawer with name) - given for HEP 1/21    Wrist flex/ext with roll up rope 3 2.5#     Supination with TB 2/10 purple  x   TB -UE horiz abd 2/15 purple  x   TB - UE flex 2/15 purple  x   TB sh ER 2/15 blue  x   TB UE lat pull down 2/15 blue  x   Pron with TB  2/15 blue  x   Pron/sup  20 2 rings tool    Baps screws cw/ccw 1ea      Clothes pins 3 min      Ball walk outs 3x      Ball toss 20 3.5/4.5 Single/double x   I Y T EXT 2/15 5  x   Shoulder IR pulley system 15 15 Pulley wt    Bicep curls pulley system 15 15     Tricep pulley sys 15 45     Lat pull down machine 15 40                                        Other:     Specific Instructions for next treatment: Manual as above and as tolerated with ex progression as tolerated. Treatment Charges: Mins Units   [x]  Modalities: CP 10 NC   [x]  Ther Exercise 40 3   [x]  Manual Therapy 20 1   []  Ther Activities     []  Aquatics     []  Vasocompression     [x]  Other     Total Treatment time 70 4       Assessment: [x] Progressing toward goals. Noted pain with wrist flex ex today. Pt continues to report improvement. Will continue to work on [employer] training as tolerated. Manual therapy helpful for improving motion and relieving pain. [] No change. [] Other:  [] Patient would continue to benefit from skilled physical therapy services in order to resume prior activities. STG: (to be met in 6-8 treatments)  1. ? Pain: to minimal  2. ? ROM: by 5 degrees or more  3. ? Strength:by 1/2 m. grade? Function:  4. Patient to be independent with home exercise program as demonstrated by performance with correct form without cues.     LTG: (to be met in 12+ treatments)  1. No pain with return to sports, lifting  2. R wrist ROM = to L wrist  3.  Wrist strength R = L                    Patient goals: Be able to lift soon    Pt. Education:  [x] Yes  [] No  [] Reviewed Prior HEP/Ed  Method of Education: [x] Verbal  [x] Demo  [] Written  Comprehension of Education:  [x] Verbalizes understanding. [x] Demonstrates understanding. [] Needs review. [] Demonstrates/verbalizes HEP/Ed previously given. Plan: [x] Continue current frequency toward long and short term goals.     [] Specific Instructions for subsequent treatments:       Time In: 0900         Time Out: 1010    Electronically signed by:  Musa Hayes PT

## 2021-03-09 ENCOUNTER — HOSPITAL ENCOUNTER (OUTPATIENT)
Dept: PHYSICAL THERAPY | Facility: CLINIC | Age: 21
Setting detail: THERAPIES SERIES
Discharge: HOME | End: 2021-03-09
Payer: COMMERCIAL

## 2021-03-09 PROCEDURE — 97140 MANUAL THERAPY 1/> REGIONS: CPT

## 2021-03-09 PROCEDURE — 97110 THERAPEUTIC EXERCISES: CPT

## 2021-03-09 NOTE — FLOWSHEET NOTE
[] [facility]) [facility] &  Therapy  [address]  [phone]  F: [phone] [] [address]   [address]  P: [phone]  F: [phone] [x] [address] &  Therapy  [address] [facility]  P: [phone]  F: [phone] [] [address]  P: [phone]  F: [phone] [] [address]  [facility]   [address]   [place]: [phone]  F: [phone]      Physical Therapy Daily Treatment Note    Date:  3/9/2021  Patient Name:  Owen Lunsford    :  2000  MRN: [id number]   Physician: Bal Burns MD                        Insurance: [facility] Medicaid  Medical Diagnosis: R Wrist/hand pain                     Rehab Codes: M25.539; M79.643, M25.531, M79.641  Onset Date: 20             Next 's appt: prn  Visit# / total visits:  (2-3x wk)     Cancels/No Shows: 0    Subjective:  Pain altered Tx:  [] No  [x] Yes  Action:See below  Pain:  [x] Yes  [] No Location: R wrist/hand Pain Rating: (0-10 scale) 0-1/10    Comments: Pt reports he is getting better, and noticing improved ROM overall. Only slight soreness mid range wrist flex. Getting a little lifting in and able to shoot a basketball now.  Slight soreness after shooting around 1.5 hours    Objective:  Modalities: Manual therapy:10 min: mobs R wrist, R-U, carpals, MFR to R wrist soft tissue, FCU, UCL, with DN to the same, wrist mobs at ulna/rad, triquetrium/lunate TFCC, DRUL  Precautions:  Exercise Reps/ Time Weight/ Level Comments     bike 5 miin     x   Wrist extension stretch 2 5 HEP with HO x   Gentle radial deviation stretch 2 5 HEP with HO x   Finger abd to closed fist 2 5 HEP with HO x   Pron/sup stretch 2 5 HEP with HO x               Wrist ext 3# 2/10   x Radial deviation  3# 2/10   x   Wrist flexion  3# 2/10 Into neutral only, half curl.  x   Towel twist   10       Bicep curl     In neutral only     Putty  squeeze  2'   Orange (in drawer with name) - given for HEP 1/21     Wrist flex/ext with roll up rope 3 2.5#       Supination with TB 2/12 purple   x   TB -UE horiz abd 2/12 purple   x   TB - UE flex 2/12 purple   x   TB sh ER 2/12 purple   x   TB UE lat pull down 2/12 purple   x   Pron with TB  1/10 purple   x   Pron/sup  30 2 rings tool x   Baps screws cw/ccw 1ea         Clothes pins 3 min         Ball walk outs 3x         Ball toss 20 3.5/4.5 Single/double x   I Y T EXT 2/15 5   x   Shoulder IR pulley system 15 15 Pulley wt     Bicep curls pulley system 15 15       Tricep pulley sys 15 45       Lat pull down machine 15 40        Clothes pins pinch grasp  3 min  green and blue  thumb, digits 4 and 5  x                                                     Other:     Specific Instructions for next treatment: Manual as above and as tolerated with ex progression as tolerated. Treatment Charges: Mins Units   [x]  Modalities: CP 10 NC   [x]  Ther Exercise 50 3   [x]  Manual Therapy 10 1   []  Ther Activities     []  Aquatics     []  Vasocompression     [x]  Other     Total Treatment time 70 4       Assessment: [x] Progressing toward goals. Noted mild pain with full wrist flex with weight. Modified to partial ROM with good tolerance. Pt continues to report improvement. Will continue to work on [employer] training and basketball at home. Follow up in 1 month with any issues. . Manual therapy helpful for improving motion and relieving pain. [] No change. [] Other:  [] Patient would continue to benefit from skilled physical therapy services in order to resume prior activities. STG: (to be met in 6-8 treatments) - achieved  1. ? Pain: to minimal  2. ? ROM: by 5 degrees or more  3. ? Strength:by 1/2 m. grade? Function:  4.  Patient to be independent with home exercise program as demonstrated by performance with correct form without cues.     LTG: (to be met in 12+ treatments)  1. No pain with return to sports, lifting - partially achieved. 2. R wrist ROM = to L wrist - achieved  3. Wrist strength R = L achieved                    Patient goals: Be able to lift soon    Pt. Education:  [x] Yes  [] No  [] Reviewed Prior HEP/Ed  Method of Education: [x] Verbal  [x] Demo  [] Written  Comprehension of Education:  [x] Verbalizes understanding. [x] Demonstrates understanding. [] Needs review. [] Demonstrates/verbalizes HEP/Ed previously given. Plan: [x] Follow up in 1 month for possible D/C.     [] Specific Instructions for subsequent treatments:       Time In: 0900         Time Out: 1010    Electronically signed by:  Oskar Nguyễn PT

## 2021-04-08 ENCOUNTER — HOSPITAL ENCOUNTER (OUTPATIENT)
Dept: PHYSICAL THERAPY | Facility: CLINIC | Age: 21
Setting detail: THERAPIES SERIES
Discharge: HOME | End: 2021-04-08
Payer: COMMERCIAL

## 2021-04-08 PROCEDURE — 97140 MANUAL THERAPY 1/> REGIONS: CPT

## 2021-04-08 PROCEDURE — 97110 THERAPEUTIC EXERCISES: CPT

## 2021-04-08 NOTE — FLOWSHEET NOTE
[] [facility]) [facility] &  Therapy  [address]  [phone]  F: [phone] [] [address]   [address]  P: [phone]  F: [phone] [x] [address] &  Therapy  [address] [facility]  P: [phone]  F: [phone] [] [address]  P: [phone]  F: [phone] [] [address]  [facility]   [address]   [place]: [phone]  F: [phone]      Physical Therapy Daily Treatment Note/Re-Eval    Date:  2021  Patient Name:  Remy Vega    :  2000  MRN: [id number]   Physician: Alanna Hare MD                        Insurance: [employer]  Medical Diagnosis: R Wrist/hand pain                     Rehab Codes: M25.539; M79.643, M25.531, M79.641  Onset Date: 20             Next 's appt: prn  Visit# / total visits:  (2-3x wk)     Cancels/No Shows: 0    Subjective:  Pain altered Tx:  [] No  [x] Yes  Action:See below  Pain:  [x] Yes  [] No Location: R wrist/hand Pain Rating: (0-10 scale) 0-1/10    Comments: Pt reports he is getting better, and noticing improved ROM overall. Just started lifting a week and a half ago again after hurting his back playing basketball a while back. Went through some PT for that, and seems to be getting better. Last time he lifted was yesterday and it feels pretty sore. Wants to hold off on most ex due to lifting yesterday. Reports mild pain with full wrist flex with weight with bicep curls.     Objective: Full ROM and strength noted, except mild pain with full wrist flexion with resistance and occasional clicking with supination and flex/ext   Modalities: Manual therapy:45min: mobs R wrist, R-U, carpals, MFR to R wrist soft tissue, FCU, UCL, with DN to the same, wrist mobs at ulna/rad, triquetrium/lunate TFCC, LIZETT; added CST with VI & D  Precautions:    Exercise: REV 10 min  Exercise Reps/ Time Weight/ Level Comments     bike 5 miin     x   Wrist extension stretch 2 5 HEP with HO x   Gentle radial deviation stretch 2 5 HEP with HO x   Finger abd to closed fist 2 5 HEP with HO x   Pron/sup stretch 2 5 HEP with HO x               Wrist ext 3# 2/10   x   Radial deviation  3# 2/10   x   Wrist flexion  3# 2/10 Into neutral only, half curl.  x   Towel twist   10       Bicep curl     In neutral only     Putty  squeeze  2'   Orange (in drawer with name) - given for HEP 1/21     Wrist flex/ext with roll up rope 3 2.5#       Supination with TB 2/12 purple   x   TB -UE horiz abd 2/12 purple   x   TB - UE flex 2/12 purple   x   TB sh ER 2/12 purple   x   TB UE lat pull down 2/12 purple   x   Pron with TB  1/10 purple   x   Pron/sup  30 2 rings tool x   Baps screws cw/ccw 1ea         Clothes pins 3 min         Ball walk outs 3x         Ball toss 20 3.5/4.5 Single/double x   I Y T EXT 2/15 5   x   Shoulder IR pulley system 15 15 Pulley wt     Bicep curls pulley system 15 15       Tricep pulley sys 15 45       Lat pull down machine 15 40        Clothes pins pinch grasp  3 min  green and blue  thumb, digits 4 and 5  x                                                     Other:     Specific Instructions for next treatment: Manual as above and as tolerated with ex progression as tolerated. Treatment Charges: Mins Units   []  Modalities: CP  NC   [x]  Ther Exercise 10 1   [x]  Manual Therapy 40 3   []  Ther Activities     []  Aquatics     []  Vasocompression     [x]  Other     Total Treatment time 50 4       Assessment: [x] Progressing toward goals. Good tolerance to new manual therapy with decreased tenderness reported post-treatment. Rev of complete lifting program with modifications made where needed. Pt continues to report improvement.  Will continue to work on [employer] training and basketball at home. Follow up in 1 month with any issues. . Manual therapy helpful for improving motion and relieving pain. [] No change. [] Other:  [] Patient would continue to benefit from skilled physical therapy services in order to resume prior activities. STG: (to be met in 6-8 treatments) - achieved  1. ? Pain: to minimal  2. ? ROM: by 5 degrees or more  3. ? Strength:by 1/2 m. grade? Function:  4. Patient to be independent with home exercise program as demonstrated by performance with correct form without cues.     LTG: (to be met in 12+ treatments) - achieved  1. No pain with return to sports, lifting - partially achieved. 2. R wrist ROM = to L wrist - achieved  3. Wrist strength R = L achieved                    Patient goals: Be able to lift soon    Pt. Education:  [x] Yes  [] No  [] Reviewed Prior HEP/Ed  Method of Education: [x] Verbal  [x] Demo  [] Written  Comprehension of Education:  [x] Verbalizes understanding. [x] Demonstrates understanding. [] Needs review. [] Demonstrates/verbalizes HEP/Ed previously given.      Plan: [x] I recommend to follow up with PT in 1 month with any issues as patient is continuing to strengthen on his own.   [] Specific Instructions for subsequent treatments:       Time In: 0900         Time Out: [address]    Electronically signed by:  Geovanna Kohler PT         Physician Signature:________________________________Date:__________________  By signing above or cosigning this note, I have reviewed this plan of care and certify a need for medically necessary rehabilitation services.      *PLEASE SIGN ABOVE AND FAX BACK ALL PAGES*

## 2021-05-18 ENCOUNTER — HOSPITAL ENCOUNTER (OUTPATIENT)
Dept: PHYSICAL THERAPY | Facility: CLINIC | Age: 21
Setting detail: THERAPIES SERIES
Discharge: HOME | End: 2021-05-18
Payer: COMMERCIAL

## 2021-05-18 PROCEDURE — 97140 MANUAL THERAPY 1/> REGIONS: CPT

## 2021-05-18 PROCEDURE — 97164 PT RE-EVAL EST PLAN CARE: CPT

## 2021-05-18 NOTE — DISCHARGE SUMMARY
[] [facility]) [facility] &  Therapy  [address]  [phone]  F: [phone] [] [address]  [place] [place] [facility]"" 36   [address]  P: [phone]  F: [phone] [x] [address] &  Therapy  [address] [facility]  P: [phone]  F: [phone] [] [address]  P: [phone]  F: [phone] [] [address]  [facility]   [address]   [place]: [phone]  F: [phone]      Physical Therapy Daily Treatment Note/Discharge Summary    Date:  2021  Patient Name:  Lucian Sorenson    :  2000  MRN: [id number]   Physician: Bro Mauricio MD                        Insurance: [employer]  Medical Diagnosis: R Wrist/hand pain                     Rehab Codes: M25.539; M79.643, M25.531, M79.641  Onset Date: 20             Next 's appt: prn  Visit# / total visits: 10/12 (2-3x wk)     Cancels/No Shows: 0    Subjective:  Pain altered Tx:  [] No  [x] Yes  Action:See below  Pain:  [x] Yes  [] No Location: R wrist/hand Pain Rating: (0-10 scale) 0/10    Comments: Pt reports he is better. He has been lifting and shooting around playing basketball without any issues overall. He reports he gets very occasional mild pain with full wrist flex with cable weight bicep curls, but as long as he does the ex correctly, he doesn't have any issues. .    Objective: Full ROM and strength noted,and no pain with full wrist flexion with resistance, R hand  = 82 lbs, L = 80 lbs. Pt is R hand dominant.    Modalities: Manual therapy:10min: mobs R wrist, R-U, carpals, MFR to R wrist soft tissue, FCU, UCL, no DN to the same, wrist mobs at ulna/rad, triquetrium/lunate TFLIZETT BUICO; added CST with VI & D  Precautions:    Exercise: REV only  Exercise Reps/ Time Weight/ Level Comments     bike 5 miin     x   Wrist extension stretch 2 5 HEP with HO x   Gentle radial deviation stretch 2 5 HEP with HO x   Finger abd to closed fist 2 5 HEP with HO x   Pron/sup stretch 2 5 HEP with HO x               Wrist ext 3# 2/10   x   Radial deviation  3# 2/10   x   Wrist flexion  3# 2/10 Into neutral only, half curl.  x   Towel twist   10       Bicep curl     In neutral only     Putty  squeeze  2'   Orange (in drawer with name) - given for HEP 1/21     Wrist flex/ext with roll up rope 3 2.5#       Supination with TB 2/12 purple   x   TB -UE horiz abd 2/12 purple   x   TB - UE flex 2/12 purple   x   TB sh ER 2/12 purple   x   TB UE lat pull down 2/12 purple   x   Pron with TB  1/10 purple   x   Pron/sup  30 2 rings tool x   Baps screws cw/ccw 1ea         Clothes pins 3 min         Ball walk outs 3x         Ball toss 20 3.5/4.5 Single/double x   I Y T EXT 2/15 5   x   Shoulder IR pulley system 15 15 Pulley wt     Bicep curls pulley system 15 15       Tricep pulley sys 15 45       Lat pull down machine 15 40        Clothes pins pinch grasp  3 min  green and blue  thumb, digits 4 and 5  x                                                     Other:     Specific Instructions for next treatment: D/C to HEP    Treatment Charges: Mins Units   []  Modalities:      []  Ther Exercise     [x]  Manual Therapy 10 1   []  Ther Activities     []  Aquatics     []  Vasocompression     [x]  Other/RE-eval 10 1   Total Treatment time 20 2       Assessment: [x] Pt achieved all STGs/LTGs. Able to lift weights and return to full sports without any issues. [] No change. [] Other:  [] Patient would continue to benefit from skilled physical therapy services in order to resume prior activities. STG: (to be met in 6-8 treatments) - achieved  1. ? Pain: to minimal  2. ? ROM: by 5 degrees or more  3. ? Strength:by 1/2 m. grade? Function:  4.  Patient to be independent with home exercise program as demonstrated by performance with correct form without cues.     LTG: (to be met in 12+ treatments) - achieved  1. No pain with return to sports, lifting - partially achieved. 2. R wrist ROM = to L wrist - achieved  3. Wrist strength R = L achieved                    Patient goals: Be able to lift soon    Pt. Education:  [x] Yes  [] No  [] Reviewed Prior HEP/Ed  Method of Education: [x] Verbal  [x] Demo  [] Written  Comprehension of Education:  [x] Verbalizes understanding. [x] Demonstrates understanding. [] Needs review. [] Demonstrates/verbalizes HEP/Ed previously given.      Plan: [x] D/C from PT with goals attained.  [] Specific Instructions for subsequent treatments:       Time In: 1000       Time Out: 1025    Electronically signed by:  Lucien Hernandez PT         Physician Signature:________________________________Date:__________________  By signing above or cosigning this note, I have reviewed this plan of care and certify a need for medically necessary rehabilitation services.      *PLEASE SIGN ABOVE AND FAX BACK ALL PAGES*

## 2021-06-28 DIAGNOSIS — M25.571 RIGHT ANKLE PAIN, UNSPECIFIED CHRONICITY: Primary | ICD-10-CM

## 2021-06-29 ENCOUNTER — OFFICE VISIT (OUTPATIENT)
Dept: ORTHOPEDIC SURGERY | Age: 21
End: 2021-06-29
Payer: COMMERCIAL

## 2021-06-29 VITALS
HEART RATE: 51 BPM | BODY MASS INDEX: 23.03 KG/M2 | SYSTOLIC BLOOD PRESSURE: 119 MMHG | WEIGHT: 170 LBS | HEIGHT: 72 IN | DIASTOLIC BLOOD PRESSURE: 76 MMHG

## 2021-06-29 DIAGNOSIS — S93.401A SEVERE ANKLE SPRAIN, RIGHT, INITIAL ENCOUNTER: Primary | ICD-10-CM

## 2021-06-29 PROCEDURE — G8420 CALC BMI NORM PARAMETERS: HCPCS | Performed by: FAMILY MEDICINE

## 2021-06-29 PROCEDURE — 1036F TOBACCO NON-USER: CPT | Performed by: FAMILY MEDICINE

## 2021-06-29 PROCEDURE — G8427 DOCREV CUR MEDS BY ELIG CLIN: HCPCS | Performed by: FAMILY MEDICINE

## 2021-06-29 PROCEDURE — 99203 OFFICE O/P NEW LOW 30 MIN: CPT | Performed by: FAMILY MEDICINE

## 2021-06-29 RX ORDER — CITALOPRAM 20 MG/1
TABLET ORAL
COMMUNITY
Start: 2021-05-18

## 2021-06-29 NOTE — PROGRESS NOTES
Palpation-Tenderness atfl, cfl some ptfl and detloid  The foot is in normal alignment. ROM:  40 degrees plantarflexion and 20 degrees dorsiflexion. Subtalar motion is 30 degrees inversion and 20 degrees eversion. Strength-WNL  Sensation-normal to light touch  Special Tests-Ankle inversion: laxity positive  Ankle eversion: laxity negative  Ankle drawer: laxity positive  External rotation:positive  Syndesmotic Squeeze test: negative  The patient is not able to single toe raise. Single leg hop test: unable to assess  Gait: Antalgic    PSYCH:  Patient has good fund of knowledge and displays understanding of exam.    RADIOLOGY: No results found. 3 views of the right foot/ankle were ordered, independently visualized by me, and discussed with patient. Findings: Right ankle radiographs demonstrate soft tissue swelling without acute osseous abnormalities no obvious fracture dislocations noted on plain film radiograph of the right ankle    Impression: No acute osseous abnormalities of the right ankle      IMPRESSION:     1. Severe ankle sprain, right, initial encounter        PLAN:   We discussed some of the etiologies and natural histories of     ICD-10-CM    1. Severe ankle sprain, right, initial encounter  S93.401A Ambulatory referral to Physical Therapy    We discussed the various treatment alternatives including anti-inflammatory medications, physical therapy, injections, further imaging studies and as a last resort surgery. At this point patient would benefit from treatment for this we will do some stabilization whether that be with a brace or boot depending on his comfort level and will start a course of formal physical therapy we will have him follow-up with us based on his progression patient and mother voiced understanding agreement this plan    No follow-ups on file.     Please be aware portions of this note were completed using voice recognition software and unforeseen errors may have occurred    Electronically signed by Abbie Regalado DO, FAOASM  on 6/29/21 at 10:54 AM EDT    No orders of the defined types were placed in this encounter.

## 2021-06-30 ENCOUNTER — HOSPITAL ENCOUNTER (OUTPATIENT)
Dept: PHYSICAL THERAPY | Facility: CLINIC | Age: 21
Setting detail: THERAPIES SERIES
Discharge: HOME | End: 2021-06-30
Payer: COMMERCIAL

## 2021-06-30 PROCEDURE — 97161 PT EVAL LOW COMPLEX 20 MIN: CPT

## 2021-06-30 PROCEDURE — 97016 VASOPNEUMATIC DEVICE THERAPY: CPT

## 2021-06-30 PROCEDURE — 97110 THERAPEUTIC EXERCISES: CPT

## 2021-06-30 NOTE — CONSULTS
[] [address]  [address]  [phone]  F: [phone] [] [address]  [facility] 36   [address]  P: [phone]  F: [phone] [] [address]  [address]  P: [phone]  F: [phone] [x] [address]  P: [phone]  F: [phone] [] [address]  [facility]   [address]   [place]: [phone]  F: [phone]      Physical Therapy Lower Extremity Evaluation    Date:  2021  Patient: Angely Anna  : 2000  MRN: [id number]  Physician: Trini Salmon DO    Insurance: [employer] [facility] (Authorization after evaluation)  Medical Diagnosis: Severe Right ankle sprain   Rehab Codes: S93.401A  Onset date: 2021    Next 's appt.: 21    Subjective:   CC:Rigth ankle pain  HPI: The patient suffered an inversion mechanism while playing basketball on 21. He had severe pain, swelling and bruising. He saw Dr. Shikha Sarah who referred him to PT. He notes improving pain and swelling, he has been wearing a lace-up ankle brace that Dr. Shikha Sarah issued which has improved pain and gait. He has been icing his ankle 1-2 times daily which also has helped. He suffered a high ankle sprain on the same ankle last year.     PMHx: [] Unremarkable [] Diabetes [] HTN  [] Pacemaker   [] MI/Heart Problems [] Cancer [] Arthritis [x] Other: Prior right high ankle sprain              [x] Refer to full medical chart  In EPIC       Comorbidities:   [] Obesity [] Dialysis  [] N/A   [x] Asthma/COPD [] Dementia [x] Other:anxiety/ depression   [] Stroke [] Sleep apnea [] Other:   [] Vascular disease [] Rheumatic disease [] Other:     Tests: [x] X-Ray: [] MRI:  [] Other:     3 views of the right Quad cane    [] Standard walker [] Rolling walker   [] 4 wheeled walker [] Standard walker [] Rolling walker   [] 4 wheeled walker    [] Wheelchair [] Wheelchair     Pain:  [x] Yes  [] No Location: right lateral ankle Pain Rating: (0-10 scale) 5/10  Pain altered Tx:  [] Yes  [x] No  Action:    Symptoms:  [x] Improving [] Worsening [] Same  Better:  [x] AM    [] PM    [] Sit    [] Rise/Sit    []Stand    [] Walk    [] Lying    [] Other:  Worse: [] AM    [x] PM    [] Sit    [] Rise/Sit    []Stand    [x] Walk    [] Lying    [] Bend                      [] Valsalva    [] Other:  Sleep: [x] OK    [] Disturbed    Objective:    ROM  ° A/P STRENGTH TESTS (+/-) Left Right Not Tested    Left Right Left Right Ant. Drawer - + []   Hip Flex WNL WNL 5 5 Eversion - - []   Ext   5 5 Inversion - +pn []   ER     Sendismotic - - []   IR     Varus Stress   [x]   ABD   5 5 Rileys   [x]   ADD     Apleys Comp.    [x]   Knee Flex WNL WNL 5 5 Apleys Dist.   [x]   Ext   5 5 Hip Scouring   [x]   Ankle DF 10/30 10/20 5 5 YUDITHs   [x]   PF 50A 40A 5 3- Piriformis   [x]   INV   5 5 Pierces   [x]   EVER   5 4 Talor Tilt   [x]        Pat-Fem Grind   [x]       OBSERVATION No Deficit Deficit Not Tested Comments   Posture    Significant edema and ecchymosis    Forward Head [] [] []    Rounded Shoulders [] [] []    Kyphosis [] [] []    Lordosis [] [] []    Lateral Shift [] [] []    Scoliosis [] [] []    Iliac Crest [] [] []    PSIS [] [] []    ASIS [] [] []    Genu Valgus [] [] []    Genu Varus [] [] []    Genu Recurvatum [] [] []    Pronation [] [] []    Supination [] [] []    Leg Length Discrp [] [] []    Slumped Sitting [] [] []    Palpation [] [x] [] TTP: anterior lateral ankle   Sensation [x] [] []    Edema [] [x] []    Neurological [x] [] []    Patellar Mobility [x] [] []    Patellar Orientation [x] [] []    Gait [] [x] [] Analysis:Mild/moderate antalgic       FUNCTION Normal Difficult Unable   Sitting [x] [] []   Standing [] [x] [] Ambulation [] [x] []   Groom/Dress [x] [] []   Lift/Carry [] [x] []   Stairs [] [x] []   Bending [x] [] []   Squat [] [x] []   Kneel [x] [] []     BALANCE/PROPRIOCEPTION              [] Not tested   Single leg stance       R                     L                                PAIN   Eyes open                     8        Sec.          >30        Sec                  . [x]    Eyes closed                          Sec. Sec                  . []        Functional Test: LEFS Score: 63% functionally impaired       Assessment:   The patient is a 22year old with a chief complaint of right ankle pain and signs and symptoms consistent with a diagnosis of right ankle sprain. He presents with pain, weakness, decreased ROM, edema, ecchymosis and functional limitations. He is a good PT candidate to address above mentioned deficits. Problems:    [x] ? Pain:  [x] ? ROM:  [x] ? Strength:  [x] ? Function:  [x] Other:  Edema, ecchymosis     STG: (to be met in 5 treatments)  1. ? Pain: 2/10  2. ? ROM: WNL  3. ? Function:  Normalize gait  4. Patient to be independent with home exercise program as demonstrated by performance with correct form without cues. LTG: (to be met in 10 treatments)  1. Patient will ambulate on uneven surfaces without limitation. 2. Patient will perform triple hop test to 90% of contralateral side. 3. Patient will resume basketball                    Patient goals: Play basketball    Rehab Potential:  [x] Good  [] Fair  [] Poor   Suggested Professional Referral:  [x] No  [] Yes:  Barriers to Goal Achievement:  [x] No  [] Yes:  Domestic Concerns:  [x] No  [] Yes:    Pt. Education:  [x] Plans/Goals, Risks/Benefits discussed  [x] Home exercise program    Method of Education: [x] Verbal  [x] Demo  [x] Written  Comprehension of Education:  [x] Verbalizes understanding. [] Demonstrates understanding. [x] Needs Review.   [] Demonstrates/verbalizes understanding of HEP/Ed previously given.    Treatment Plan:  [x] Therapeutic Exercise   41034  [] Iontophoresis: 4 mg/mL Dexamethasone Sodium Phosphate  mAmin  78005   [x] Therapeutic Activity  13114 [x] Vasopneumatic cold with compression  71165    [] Gait Training   68041 [] Ultrasound   52269   [x] Neuromuscular Re-education  40009 [] Electrical Stimulation Unattended  06192   [x] Manual Therapy  36576 [] Electrical Stimulation Attended  73168   [x] Instruction in HEP  [] Lumbar/Cervical Traction  27141   [] Aquatic Therapy   12407 [x] Cold/hotpack    [] Massage   65783      [] Dry Needling, 1 or 2 muscles  78872   [] Biofeedback, first 15 minutes   65502  [] Biofeedback, additional 15 minutes   38564 [] Dry Needling, 3 or more muscles  44484       Frequency:  2 x/week for 10 visits        Todays Treatment:  Modalities: Vasopneumatic cold with compression 15 min  Precautions:  Exercises:Patient education on diagnosis, prognosis, plan of care and home exercise program.  Exercise Reps/ Time Weight/ Level Comments HEP   Ankle pumps x20   x   Ankle circles x20   x   D/L heel raise x15   x   S/L hold x30\"   x   Gastroc stretch 2x20\"   x   Other:    Specific Instructions for next treatment:  Progress DF ROM, Hip and ankle strength and proprioception      Evaluation Complexity:  History (Personal factors, comorbidities) [] 0 [x] 1-2 [] 3+   Exam (limitations, restrictions) [x] 1-2 [] 3 [] 4+   Clinical presentation (progression) [x] Stable [] Evolving  [] Unstable   Decision Making [x] Low [] Moderate [] High    [x] Low Complexity [] Moderate Complexity [] High Complexity       Treatment Charges: Mins Units   [x] Evaluation       [x]  Low       []  Moderate       []  High 15 1   []  Modalities     [x]  Ther Exercise 15 1   []  Manual Therapy     []  Ther Activities     []  Aquatics     [x]  Vasocompression 15 1   []  Other       TOTAL TREATMENT TIME: 45    Time in: 1600   Time Out:1650    Electronically signed by: Elaina Melvin PT        Physician Signature:________________________________Date:__________________  By signing above or cosigning this note, I have reviewed this plan of care and certify a need for medically necessary rehabilitation services.      *PLEASE SIGN ABOVE AND FAX BACK ALL PAGES*

## 2021-07-07 ENCOUNTER — HOSPITAL ENCOUNTER (OUTPATIENT)
Dept: PHYSICAL THERAPY | Facility: CLINIC | Age: 21
Setting detail: THERAPIES SERIES
Discharge: HOME | End: 2021-07-07
Payer: COMMERCIAL

## 2021-07-07 PROCEDURE — 97110 THERAPEUTIC EXERCISES: CPT

## 2021-07-07 PROCEDURE — 97112 NEUROMUSCULAR REEDUCATION: CPT

## 2021-07-07 PROCEDURE — 97016 VASOPNEUMATIC DEVICE THERAPY: CPT

## 2021-07-07 NOTE — FLOWSHEET NOTE
[] [facility]) [facility] &  Therapy  [address]  [phone]  F: [phone] [] [address]  [facility]   [address]  P: [phone]  F: [phone] [] [address] &  Therapy  [address]  P: [phone]  F: [phone] [x] [address]  P: [phone]  F: [phone] [] [address]  [facility]   [address]   [place]: [phone]  F: [phone]      Physical Therapy Daily Treatment Note    Date:  2021  Patient Name:  Cristobal Lan    :  2000  MRN: [id number]  Physician: Anya Gross DO                          Insurance: [employer] Source of [facility] (Authorization after evaluation)  Medical Diagnosis: Severe Right ankle sprain          Rehab Codes: S93.401A  Onset date: 2021                         Next 's appt.: 21    Visit# / total visits: 2/10    Cancels/No Shows: 0/0    Subjective:    Pain:  [x] Yes  [] No Location: right ankle  Pain Rating: (0-10 scale) 2/10  Pain altered Tx:  [x] No  [] Yes  Action:  Comments:  Patient reports improved ankle pain and swelling.     Objective:    Modalities: Vasopneumatic cold with compression 15 min  Precautions:  Exercises:Patient education on diagnosis, prognosis, plan of care and home exercise program.  Exercise Reps/ Time Weight/ Level Comments HEP   Ankle pumps      x   Ankle circles      x   D/L heel raise      x   S/L hold      x   MOBO inv/ev 20 ea  Some pain    Gastroc stretch 2x20\"     x   S/L heel raise 3x15      3 way reach 3x5      Lateral band walk 3x20' blue     BOSU squat 3x12      S/L RDL 3x10             Agility       2 step quick 4x      Lateral quick 4x      Single quick 4x      Z cut 4x                    Other: Manual:  Supine Retro massage. MFR to peroneals      Treatment Charges: Mins Units   []  Modalities     [x]  Ther Exercise 20    [x]  Manual Therapy 10    []  Ther Activities     [x]  NMR 15    [x]  Vasocompression 10    []  Other     Total Treatment time 55        Assessment: [x] Progressing toward goals. Patient had significant improvement in edema and bruising. He has some continued restriction in DF and notable weakness in PF. He had some pain to peroneal tendons with MOBO. No pain with single leg stability. [] No change. [] Other:  [x] Patient would continue to benefit from skilled physical therapy services in order to: Improve pain, ankle ROM, strength, proprioception and function. STG/LTG:STG: (to be met in 5 treatments)  1. ? Pain: 2/10  2. ? ROM: WNL  3. ? Function:  Normalize gait  4. Patient to be independent with home exercise program as demonstrated by performance with correct form without cues. LTG: (to be met in 10 treatments)  1. Patient will ambulate on uneven surfaces without limitation. 2. Patient will perform triple hop test to 90% of contralateral side. 3. Patient will resume basketball         Pt. Education:  [x] Yes  [] No  [x] Reviewed Prior HEP/Ed  Method of Education: [x] Verbal  [] Demo  [] Written  Comprehension of Education:  [] Verbalizes understanding. [] Demonstrates understanding. [] Needs review. [x] Demonstrates/verbalizes HEP/Ed previously given. Plan: [x] Continue current frequency toward long and short term goals. [x] Specific Instructions for subsequent treatments: progress DF ROM, Ankle strength and proprioception as tolerated.       Time In:0930           Time Out: 1030    Electronically signed by:  Yumiko Mcelroy PT

## 2021-07-08 ENCOUNTER — APPOINTMENT (OUTPATIENT)
Dept: PHYSICAL THERAPY | Facility: CLINIC | Age: 21
End: 2021-07-08
Payer: COMMERCIAL

## 2021-07-09 ENCOUNTER — HOSPITAL ENCOUNTER (OUTPATIENT)
Dept: PHYSICAL THERAPY | Facility: CLINIC | Age: 21
Setting detail: THERAPIES SERIES
Discharge: HOME | End: 2021-07-09
Payer: COMMERCIAL

## 2021-07-09 PROCEDURE — 97110 THERAPEUTIC EXERCISES: CPT

## 2021-07-09 PROCEDURE — 97016 VASOPNEUMATIC DEVICE THERAPY: CPT

## 2021-07-09 NOTE — FLOWSHEET NOTE
[] [facility]) [facility]  [facility]  [address]  [phone]  F: [phone] [] [address]  [employer] [address]   [address]  P: [phone]  F: [phone] [] [address] &  Therapy  [address]  P: [phone]  F: [phone] [x] [address]  P: [phone]  F: [phone] [] [address]  [facility]   [address]   [place]: [phone]  F: [phone]      Physical Therapy Daily Treatment Note    Date:  2021  Patient Name:  Michelle Alvarez    :  2000  MRN: [id number]  Physician: Mortimer Ohara, DO                          Insurance: Care Source of [facility] (Authorization after evaluation)  Medical Diagnosis: Severe Right ankle sprain          Rehab Codes: S93.401A  Onset date: 2021                         Next 's appt.: 21    Visit# / total visits: 3/10    Cancels/No Shows: 0/0    Subjective:    Pain:  [x] Yes  [] No Location: right ankle  Pain Rating: (0-10 scale) 0/10  Pain altered Tx:  [x] No  [] Yes  Action:  Comments:  Patient arrives without any pain. States to having slight soreness after last visit, but it quickly improves. Voices significant improvement.      Objective:    Modalities: Vasopneumatic cold with compression 15 min  Precautions:  Exercises:Patient education on diagnosis, prognosis, plan of care and home exercise program.  Exercise Reps/ Time Weight/ Level Comments HEP   Ellipitcal  5'      Ankle pumps      x   Ankle circles      x   D/L heel raise      x   S/L hold      x   MOBO inv/ev 20 ea  Some pain    Gastroc stretch 2x20\"     x   S/L heel raise 3x15      3 way reach 3x5      Lateral band walk 3x20' blue     BOSU squat 3x12      S/L RDL 3x10             Agility       2 step quick 4x      Lateral quick 4x      Single quick 4x      Z cut 4x                    Other: Manual:  Supine Retro massage. MFR to peroneals- held today       Treatment Charges: Mins Units   []  Modalities     [x]  Ther Exercise 30 2   [x]  Manual Therapy     []  Ther Activities     []  NMR     [x]  Vasocompression 15 1   []  Other     Total Treatment time 45 3       Assessment: [x] Progressing toward goals. Patient again arrives with significant improvement, therefore inititaed treatment on ellipitical without pain. Continued with agilty and SL strengthening/stabilization as listed above. Pt with most soreness during [address] [employer] board eversion, noting lateral ankle pain, able to continue with exercise. Ended with vasocompression for pain and edema control. [] No change. [] Other:  [x] Patient would continue to benefit from skilled physical therapy services in order to: Improve pain, ankle ROM, strength, proprioception and function. STG/LTG:STG: (to be met in 5 treatments)  1. ? Pain: 2/10  2. ? ROM: WNL  3. ? Function:  Normalize gait  4. Patient to be independent with home exercise program as demonstrated by performance with correct form without cues. LTG: (to be met in 10 treatments)  1. Patient will ambulate on uneven surfaces without limitation. 2. Patient will perform triple hop test to 90% of contralateral side. 3. Patient will resume basketball         Pt. Education:  [x] Yes  [] No  [x] Reviewed Prior HEP/Ed  Method of Education: [x] Verbal  [] Demo  [] Written  Comprehension of Education:  [] Verbalizes understanding. [] Demonstrates understanding. [] Needs review. [x] Demonstrates/verbalizes HEP/Ed previously given. Plan: [x] Continue current frequency toward long and short term goals. [x] Specific Instructions for subsequent treatments: progress DF ROM, Ankle strength and proprioception as tolerated.       Time In: 0800           Time Out: 0900    Electronically signed by:  Alysia Davis Elena Babin, PT

## 2021-07-12 ENCOUNTER — HOSPITAL ENCOUNTER (OUTPATIENT)
Dept: PHYSICAL THERAPY | Facility: CLINIC | Age: 21
Setting detail: THERAPIES SERIES
Discharge: HOME | End: 2021-07-12
Payer: COMMERCIAL

## 2021-07-12 PROCEDURE — 97110 THERAPEUTIC EXERCISES: CPT

## 2021-07-12 PROCEDURE — 97016 VASOPNEUMATIC DEVICE THERAPY: CPT

## 2021-07-12 NOTE — FLOWSHEET NOTE
[] [facility]) [facility]  [facility]  [address]  [phone]  F: [phone] [] [address]  [facility]   [address]  P: [phone]  F: [phone] [] [address] &  Therapy  [address] [facility]  P: [phone]  F: [phone] [x] [address]  P: [phone]  F: [phone] [] [address]  [facility]   [address]   [place]: [phone]  F: [phone]      Physical Therapy Daily Treatment Note     Date:  2021  Patient Name:  Yamilet Haddad    :  2000  MRN: [id number]  Physician: Griselda Cole DO                          Insurance: [employer] Source of [facility] (Authorization after evaluation)  Medical Diagnosis: Severe Right ankle sprain          Rehab Codes: S93.401A  Onset date: 2021                         Next 's appt.: 21    Visit# / total visits: 4/10    Cancels/No Shows: 0/0    Subjective:    Pain:  [x] Yes  [] No Location: right ankle  Pain Rating: (0-10 scale) 0/10  Pain altered Tx:  [x] No  [] Yes  Action:  Comments:  Patient arrives without any pain or soreness. improves. PLEASED W/ PROGRESS W/ ANKLEt improvement.      Objective:    Modalities: Vasopneumatic cold with compression 15 min  Precautions:  Exercises:Patient education on diagnosis, prognosis, plan of care and home exercise program.  Exercise Reps/ Time Weight/ Level Comments HEP   Ellipitcal  5'  Bike today as EFX busy    Ankle pumps      x   Ankle circles      x   D/L heel raise      x   S/L hold      x   MOBO inv/ev 20 ea  Some pain    Gastroc stretch 2x20\"     x   S/L heel raise 3x15      3 way reach 3x5      Lateral band walk 3x20' blue     BOSU squat 3x12      S/L RDL 3x10             Agility       2 step quick 4x      Lateral quick 4x      Single quick 4x      Z cut 4x                    Other: Manual:  Supine Retro massage. MFR to peroneals- held today       Treatment Charges: Mins Units   []  Modalities     [x]  Ther Exercise 35 2   [x]  Manual Therapy     []  Ther Activities     []  NMR     [x]  Vasocompression 15 1   []  Other     Total Treatment time 45 3       Assessment: [x] Progressing toward goals. Lateral ankle discomfort w/  SL heel raises on eccentric. No discomfort but soreness post agility drills. Ended with vasocompression for pain and edema control. [] No change. [] Other:  [x] Patient would continue to benefit from skilled physical therapy services in order to: Improve pain, ankle ROM, strength, proprioception and function. STG/LTG:STG: (to be met in 5 treatments)  1. ? Pain: 2/10  2. ? ROM: WNL  3. ? Function:  Normalize gait  4. Patient to be independent with home exercise program as demonstrated by performance with correct form without cues. LTG: (to be met in 10 treatments)  1. Patient will ambulate on uneven surfaces without limitation. 2. Patient will perform triple hop test to 90% of contralateral side. 3. Patient will resume basketball         Pt. Education:  [x] Yes  [] No  [x] Reviewed Prior HEP/Ed  Method of Education: [x] Verbal  [] Demo  [] Written  Comprehension of Education:  [] Verbalizes understanding. [] Demonstrates understanding. [] Needs review. [x] Demonstrates/verbalizes HEP/Ed previously given. Plan: [x] Continue current frequency toward long and short term goals. [x] Specific Instructions for subsequent treatments: progress DF ROM, Ankle strength and proprioception as tolerated.       Time In: 10:02           Time Out: 11:00    Electronically signed by:  Jared Fonseca, PT

## 2021-07-14 ENCOUNTER — HOSPITAL ENCOUNTER (OUTPATIENT)
Dept: PHYSICAL THERAPY | Facility: CLINIC | Age: 21
Setting detail: THERAPIES SERIES
Discharge: HOME | End: 2021-07-14
Payer: COMMERCIAL

## 2021-07-14 PROCEDURE — 97110 THERAPEUTIC EXERCISES: CPT

## 2021-07-14 PROCEDURE — 97016 VASOPNEUMATIC DEVICE THERAPY: CPT

## 2021-07-14 PROCEDURE — 97112 NEUROMUSCULAR REEDUCATION: CPT

## 2021-07-19 ENCOUNTER — HOSPITAL ENCOUNTER (OUTPATIENT)
Dept: PHYSICAL THERAPY | Facility: CLINIC | Age: 21
Setting detail: THERAPIES SERIES
Discharge: HOME | End: 2021-07-19
Payer: COMMERCIAL

## 2021-07-19 PROCEDURE — 97016 VASOPNEUMATIC DEVICE THERAPY: CPT

## 2021-07-19 PROCEDURE — 97110 THERAPEUTIC EXERCISES: CPT

## 2021-07-19 PROCEDURE — 97112 NEUROMUSCULAR REEDUCATION: CPT

## 2021-07-19 NOTE — FLOWSHEET NOTE
[] [facility]) [facility] &  Therapy  [address]  [phone]  F: [phone] [] [address]  [place] [address]   [address]  P: [phone]  F: [phone] [] [address] &  Therapy  [address]  P: [phone]  F: [phone] [x] [address]  P: [phone]  F: [phone] [] [address]  [facility]   [address]   [place]: [phone]  F: [phone]      Physical Therapy Daily Treatment Note     Date:  2021  Patient Name:  Sandie Melendez    :  2000  MRN: [id number]  Physician: Tish Valle DO                          Insurance: [facility] Source of [facility] (Authorization after evaluation)  Medical Diagnosis: Severe Right ankle sprain          Rehab Codes: S93.401A  Onset date: 2021                         Next 's appt.: 21    Visit# / total visits: 6/10    Cancels/No Shows: 0/0    Subjective:    Pain:  [x] Yes  [] No Location: right ankle  Pain Rating: (0-10 scale) 0/10  Pain altered Tx:  [x] No  [] Yes  Action:  Comments:  Pt arrives w/ no pain or new symptoms to report. States he attempted jump shots over the weekend and was unable to jump without pain.      Objective:    Modalities: Vasopneumatic cold with compression 15 min  Precautions:  Exercises:Patient education on diagnosis, prognosis, plan of care and home exercise program.  Exercise Reps/ Time Weight/ Level Comments HEP   Ellipitcal  '  Bike today as EFX busy    Ankle pumps      x   D1 D2 PNF 20 ea manual     Ankle circles      x   D/L HR      x   S/L hold 3x30\"  BOSU   x   MOBO inv/ev 20 ea  Some pain    Gastroc stretch 2x20\"     x   S/L heel raise 3x15      3 way reach 3x8 BOSU barefoot    Lateral band walk 3x20' [facility] 3x20' silver     BOSU squat 3x12      S/L RDL 3x10 T Band pad blue Bare foot    SL Chest pass 3x10 AirEx RLE    Inclined calf raise 2x15  Double leg, low           Agility       2 step quick 4x      Lateral quick 4x      Single quick 4x      Z cut 4x      Back z-cut 4x      Fwd back 4x      Scissor skip 2x      D/L Hop 2x      S/L Hop 2x ea                    Other: Manual:  Supine Retro massage. MFR to peroneals- held today       Treatment Charges: Mins Units   []  Modalities     [x]  Ther Exercise 35 2   []  Manual Therapy     []  Ther Activities     [x]  NMR 10 1   [x]  Vasocompression 15 1   []  Other     Total Treatment time 60 4       Assessment: [x] Progressing toward goals. Progressed patient to SL higher balance exercises - patient reports incr pain post BOSU 3 way reach at extensor digitorum brevis region at lateral dorsum of foot that subsides at rest. Pt req vaso post treatment for symptom control. [] No change. [] Other:  [x] Patient would continue to benefit from skilled physical therapy services in order to: Improve pain, ankle ROM, strength, proprioception and function. STG/LTG:STG: (to be met in 5 treatments)  1. ? Pain: 2/10. Met 7/14  2. ? ROM: WNL. Met 7/14  3. ? Function:  Normalize gait. Met 7/14  4. Patient to be independent with home exercise program as demonstrated by performance with correct form without cues. LTG: (to be met in 10 treatments)  1. Patient will ambulate on uneven surfaces without limitation. 2. Patient will perform triple hop test to 90% of contralateral side. 3. Patient will resume basketball         Pt. Education:  [x] Yes  [] No  [x] Reviewed Prior HEP/Ed  Method of Education: [x] Verbal  [] Demo  [] Written  Comprehension of Education:  [] Verbalizes understanding. [] Demonstrates understanding. [] Needs review. [x] Demonstrates/verbalizes HEP/Ed previously given. Plan: [x] Continue current frequency toward long and short term goals.     [x] Specific Instructions for subsequent treatments: progress DF ROM, Ankle strength and proprioception as tolerated.       Time In: 1000          Time Out: 1100    Electronically signed by:  America Harrington PTA

## 2021-07-21 ENCOUNTER — HOSPITAL ENCOUNTER (OUTPATIENT)
Dept: PHYSICAL THERAPY | Facility: CLINIC | Age: 21
Setting detail: THERAPIES SERIES
Discharge: HOME | End: 2021-07-21
Payer: COMMERCIAL

## 2021-07-21 PROCEDURE — 97112 NEUROMUSCULAR REEDUCATION: CPT

## 2021-07-21 PROCEDURE — 97016 VASOPNEUMATIC DEVICE THERAPY: CPT

## 2021-07-21 PROCEDURE — 97110 THERAPEUTIC EXERCISES: CPT

## 2021-07-21 NOTE — FLOWSHEET NOTE
[] [facility]) - [facility] &  Therapy  [address]  [phone]  F: [phone] [] [address]  [place] [address]   [address]  P: [phone]  F: [phone] [] [address] &  Therapy  [address]  P: [phone]  F: [phone] [x] [address]  P: [phone]  F: [phone] [] [address]  [facility]   [address]   [place]: [phone]  F: [phone]      Physical Therapy Daily Treatment Note     Date:  2021  Patient Name:  Perry Mauricio    :  2000  MRN: [id number]  Physician: Sanford Dawn DO                          Insurance: [facility] Source of [facility] (Authorization after evaluation)  Medical Diagnosis: Severe Right ankle sprain          Rehab Codes: S93.401A  Onset date: 2021                         Next Dr's appt.: 21    Visit# / total visits: 710    Cancels/No Shows: 0/0    Subjective:    Pain:  [x] Yes  [] No Location: right ankle  Pain Rating: (0-10 scale) 0/10  Pain altered Tx:  [x] No  [] Yes  Action:  Comments:  Pt arrives w/ no pain or new symptoms to report. She had some pain during last session but felt better after icing.     Objective:    Modalities: Vasopneumatic cold with compression 15 min  Precautions:  Exercises:Patient education on diagnosis, prognosis, plan of care and home exercise program.  Exercise Reps/ Time Weight/ Level Comments HEP   Ellipitcal  '  Bike today as EFX busy    Ankle pumps      x   D1 D2 PNF 2x20 ea manual     Ankle circles      x   D/L HR 3x10/10/  10    3 angle x   S/L hold 3x30\"  BOSU   x   MOBO inv/ev 20 ea      Gastroc stretch 2x20\"     x   S/L heel raise pain      3 way reach 3x5 BOSU     Lateral band walk 3x20' silver     Monsterwalks  silver     BOSU squat 6x12  3 each side    S/L RDL 3x10 T Band pad blue Bare foot    SL Chest pass 3x10 AirEx RLE    SL RDL 3x8 1/2 roll     Box jump up 3x8 12\"     Agility       2 step quick       Lateral quick       Single quick       Z cut       Back z-cut       Fwd back       Scissor skip       D/L Hop       S/L Hop                     Other: Manual:  Supine Retro massage. MFR to peroneals- held today       Treatment Charges: Mins Units   []  Modalities     [x]  Ther Exercise 40 3   []  Manual Therapy     []  Ther Activities     [x]  NMR 17 1   [x]  Vasocompression 15 1   []  Other     Total Treatment time 72 4       Assessment: [x] Progressing toward goals. Patient had some lateral ankle pain in area of peroneal tendons at end range PF with S/L HR. No agility was performed to avoid irritation of peroneal tendons. Added box jumps without complaint. The focus of treatment was on continued S/L stability. No anterior impingement with standing DF test.    [] No change. [] Other:  [x] Patient would continue to benefit from skilled physical therapy services in order to: Improve pain, ankle ROM, strength, proprioception and function. STG/LTG:STG: (to be met in 5 treatments)  1. ? Pain: 2/10. Met 7/14  2. ? ROM: WNL. Met 7/14  3. ? Function:  Normalize gait. Met 7/14  4. Patient to be independent with home exercise program as demonstrated by performance with correct form without cues. LTG: (to be met in 10 treatments)  1. Patient will ambulate on uneven surfaces without limitation. 2. Patient will perform triple hop test to 90% of contralateral side. 3. Patient will resume basketball         Pt. Education:  [x] Yes  [] No  [x] Reviewed Prior HEP/Ed  Method of Education: [x] Verbal  [] Demo  [] Written  Comprehension of Education:  [] Verbalizes understanding. [] Demonstrates understanding. [] Needs review. [x] Demonstrates/verbalizes HEP/Ed previously given.      Plan: [x] Continue current frequency toward long and short term goals. [x] Specific Instructions for subsequent treatments: progress DF ROM, Ankle strength and proprioception as tolerated.       Time In: 0930          Time Out: 4379    Electronically signed by:  John Paul Mcnulty PT

## 2021-07-26 ENCOUNTER — HOSPITAL ENCOUNTER (OUTPATIENT)
Dept: PHYSICAL THERAPY | Facility: CLINIC | Age: 21
Setting detail: THERAPIES SERIES
Discharge: HOME | End: 2021-07-26
Payer: COMMERCIAL

## 2021-07-26 PROCEDURE — 97112 NEUROMUSCULAR REEDUCATION: CPT

## 2021-07-26 PROCEDURE — 97110 THERAPEUTIC EXERCISES: CPT

## 2021-07-26 PROCEDURE — 97016 VASOPNEUMATIC DEVICE THERAPY: CPT

## 2021-07-26 NOTE — FLOWSHEET NOTE
[] [facility]) [facility] &  Therapy  [address]  [phone]  F: [phone] [] [address]  [facility]   [address]  P: [phone]  F: [phone] [] [address] &  Therapy  [address]  P: [phone]  F: [phone] [x] [address]  P: [phone]  F: [phone] [] [address]  [facility]   [address]   [place]: [phone]  F: [phone]      Physical Therapy Daily Treatment Note     Date:  2021  Patient Name:  Mckenzie Givens    :  2000  MRN: [id number]  Physician: Jaleel Corrales DO                          Insurance: Care Source of [facility] (Authorization after evaluation)  Medical Diagnosis: Severe Right ankle sprain          Rehab Codes: S93.401A  Onset date: 2021                         Next Dr's appt.: 21    Visit# / total visits: 8/10    Cancels/No Shows: 0/0    Subjective:    Pain:  [x] Yes  [] No Location: right ankle  Pain Rating: (0-10 scale) 0/10  Pain altered Tx:  [x] No  [] Yes  Action:  Comments:  Pt arrives reporting no new pain. Attempted playing a game of basketball yesterday in his brace and reports his only real pain/trouble was planting to cut.     Objective:    Modalities: Vasopneumatic cold with compression 15 min  Precautions:  Exercises:Patient education on diagnosis, prognosis, plan of care and home exercise program.  Exercise Reps/ Time Weight/ Level Comments HEP   Ellipitcal  '  Bike today as EFX busy    Ankle pumps      x   D1 D2 PNF 20 ea manual     Ankle circles      x   D/L HR      x   S/L hold 3x8  BOSU  Fwd dip, retro lunge x   MOBO inv/ev 3x8      Gastroc stretch 2x20\"     x   S/L heel raise 3x15      3 way reach 3x8 Airex barefoot    Lateral band walk 3x20' silver 4 square  Side step, monster walk    BOSU squat 3x12      S/L RDL 3x10 T Band pad blue Bare foot    Split squat + chest pass 3x8 12''     Elevated heel squat next                    Agility       2 step quick 4x      Lateral quick 4x      Single quick 4x      Z cut 4x      Back z-cut 4x      Fwd back 4x      Scissor skip 2x      D/L Hop 2x      S/L Hop 2x ea                    Other: Manual:  Supine Retro massage. MFR to peroneals- held today       Treatment Charges: Mins Units   []  Modalities     [x]  Ther Exercise 40 3   []  Manual Therapy     []  Ther Activities     [x]  NMR 20 1   [x]  Vasocompression 15 1   []  Other     Total Treatment time 75 3       Assessment: [x] Progressing toward goals. Pt continues to improve strength and agility. AirEx 3 way reach causes minimal increase in pain, that pt tolerates and completes circuit. Added cutting drill w/ no incr pain. Ended treatment w/ vaso. [] No change. [] Other:  [x] Patient would continue to benefit from skilled physical therapy services in order to: Improve pain, ankle ROM, strength, proprioception and function. STG/LTG:STG: (to be met in 5 treatments)  1. ? Pain: 2/10. Met 7/14  2. ? ROM: WNL. Met 7/14  3. ? Function:  Normalize gait. Met 7/14  4. Patient to be independent with home exercise program as demonstrated by performance with correct form without cues. LTG: (to be met in 10 treatments)  1. Patient will ambulate on uneven surfaces without limitation. 2. Patient will perform triple hop test to 90% of contralateral side. 3. Patient will resume basketball         Pt. Education:  [x] Yes  [] No  [x] Reviewed Prior HEP/Ed  Method of Education: [x] Verbal  [] Demo  [] Written  Comprehension of Education:  [] Verbalizes understanding. [] Demonstrates understanding. [] Needs review. [x] Demonstrates/verbalizes HEP/Ed previously given. Plan: [x] Continue current frequency toward long and short term goals.     [x] Specific Instructions for subsequent treatments: progress DF ROM, Ankle strength and proprioception as tolerated.        Time In: 1100           Time Out: 4096    Electronically signed by:  Ivory Noe PTA

## 2021-07-30 ENCOUNTER — HOSPITAL ENCOUNTER (OUTPATIENT)
Dept: PHYSICAL THERAPY | Facility: CLINIC | Age: 21
Setting detail: THERAPIES SERIES
Discharge: HOME | End: 2021-07-30
Payer: COMMERCIAL

## 2021-07-30 PROCEDURE — 97016 VASOPNEUMATIC DEVICE THERAPY: CPT

## 2021-07-30 PROCEDURE — 97140 MANUAL THERAPY 1/> REGIONS: CPT

## 2021-07-30 PROCEDURE — 97110 THERAPEUTIC EXERCISES: CPT

## 2021-07-30 NOTE — FLOWSHEET NOTE
[] [facility]) [facility] &  Therapy  [address]  [phone]  F: [phone] [] [address]  [facility]   [address]  P: [phone]  F: [phone] [] [address] &  Therapy  [address] [facility]  P: [phone]  F: [phone] [x] [address]  P: [phone]  F: [phone] [] [address]  [facility]   [address]   [place]: [phone]  F: [phone]      Physical Therapy Daily Treatment Note     Date:  2021  Patient Name:  Avila Schneider    :  2000  MRN: [id number]  Physician: Jon Christiansen DO                          Insurance: Care Source of [facility] (Authorization after evaluation)  Medical Diagnosis: Severe Right ankle sprain          Rehab Codes: S93.401A  Onset date: 2021                         Next 's appt.: 21    Visit# / total visits: 9/10    Cancels/No Shows: 0/0    Subjective:    Pain:  [x] Yes  [] No Location: right ankle  Pain Rating: (0-10 scale) 0/10  Pain altered Tx:  [x] No  [] Yes  Action:  Comments:  Pt arrives reporting mild soreness after playing basketball yesterday. Points to lateral dorsum of foot/ankle being most sore as well as quads and L side low back.      Objective:    Modalities: Vasopneumatic cold with compression 15 min  Precautions:  Exercises:Patient education on diagnosis, prognosis, plan of care and home exercise program.  Exercise Reps/ Time Weight/ Level Comments HEP   Elliptical  '  Bike today as EFX busy    Ankle pumps      x   D1 D2 PNF 20 ea manual     Ankle circles      x   D/L HR      x   S/L hold 3x8  BOSU  Fwd dip, retro lunge x   MOBO inv/ev 3x8      Gastroc stretch 2x20\"    x   S/L heel raise 3x15      3 way reach 3x8 Airex barefoot    Box band walk [x] Yes  [] No  [x] Reviewed Prior HEP/Ed  Method of Education: [x] Verbal  [] Demo  [] Written  Comprehension of Education:  [] Verbalizes understanding. [] Demonstrates understanding. [] Needs review. [x] Demonstrates/verbalizes HEP/Ed previously given. Plan: [x] Continue current frequency toward long and short term goals. [x] Specific Instructions for subsequent treatments: progress DF ROM, Ankle strength and proprioception as tolerated.        Time In: 3500           Time Out: 1050    Electronically signed by:  Pato Casanova PTA

## 2021-08-03 ENCOUNTER — HOSPITAL ENCOUNTER (OUTPATIENT)
Dept: PHYSICAL THERAPY | Facility: CLINIC | Age: 21
Setting detail: THERAPIES SERIES
Discharge: HOME | End: 2021-08-03
Payer: COMMERCIAL

## 2021-08-03 PROCEDURE — 97110 THERAPEUTIC EXERCISES: CPT

## 2021-08-16 ENCOUNTER — HOSPITAL ENCOUNTER (OUTPATIENT)
Dept: OCCUPATIONAL THERAPY | Facility: CLINIC | Age: 21
Setting detail: THERAPIES SERIES
Discharge: HOME | End: 2021-08-16
Payer: COMMERCIAL

## 2021-08-16 PROCEDURE — 97165 OT EVAL LOW COMPLEX 30 MIN: CPT

## 2021-08-16 NOTE — CONSULTS
[] [facility]       Occupational Therapy 1st floor       [address], [place]         Phone: [phone]       Fax: [phone] [x] [place] [address] Occupational Therapy  [address] [place] [address]  Phone: [phone]  Fax: [phone]       Occupational Therapy Hand & Upper Extremity  Initial Evaluation    Date: 2021      Patient: Jeri Saleem  : 2000  MRN: [id number]  Referring Provider: Lauren Luong MD  Insurance: [facility]     Medical Diagnosis: Right wrist tendinitis (M77.8)   Rehab Codes: Pain in wrist M25.53, stiffness in wrist M25.63,  muscle weakness generalized M62.81. Onset Date: 20    Next Dr. Arielle Loen: None scheduled  #Visits/Total Visits:   2 times a week for 12 visits  Cancels/ No Shows:  0/0    Subjective:   CC:  Pain and decreased wrist motion  HPI: (onset date: 20) Was lifting weights at the gym and \"over loaded the wrist curls\". Pt reported progressive pain, then didn't exercise for a month and symptoms went away. He went back to exercising and the symptoms returned \"but worse\". Surgery Date: NA None    Involved Extremity: Right   Dominant Extremity: Right    Past Medical History: Unremarkable          Comorbidities: NA    Medications: Refer to Medical chart in Epic Allergies:  Bee stings - no Epi pen, latex, Cefixime, strawberry flavor    Pain: Intensity:   2/10 Location: Right ulnar wrist tina the course of the extensor carpi ulnaris (ECU)  Pain Type: Intermittant and with movement/activity  Pain Altered Tx: no  Action Taken:none            Home Environment:    Pt lives in a House With 2 SHREYA and B Handrail   The washing machine is on the main level    Vocation [employer]    Job Status [x]Normal duty []Light duty [] Off due to condition []Retired  []Not employed []Disability  []Other:  []  Return to work:    Work activities/duties Driving, computer     Avocational Activities Weight lifting, basketball.    [] Baljinder Countess     [] Grandparenting     [] Care giver [] OTHER    [] NA       ADL/IADL Previous level of function Current level of function Who assists or modifications made or needed   Bathing  [x] Independent    [] Assist  [x] Independent    [] Assist     Dress/grooming [x] Independent    [] Assist  [x] Independent    [] Assist     Transfer/mobility [x] Independent    [] Assist  [x] Independent    [] Assist     Feeding [x] Independent    [] Assist  [x] Independent    [] Assist     Toileting [x] Independent    [] Assist  [x] Independent    [] Assist     Driving [x] Independent    [] Assist  [x] Independent    [] Assist     Housekeeping [x] Independent    [] Assist  [x] Independent    [] Assist     Grocery shop/meal prep [x] Independent    [] Assist  [x] Independent    [] Assist       Device: Independent   Orthosis: NA    Objective: Tests/Measurements: Upper Extremity Functional Index  Current Functional Level:  75 points,  functionally impaired as measured with the Upper Extremity Functional Index Survey. 0-80 scale, with 80 = no Deficits  (The UEFI model does not provide any specific cut off points that could classify the upper limb disability degree, however, a minimal detectable change of 9 points is provided. This means that for improvement or deterioration to be considered, between two subsequent evaluations, the scores must differ by at least 9 points.)    AROM:  WRIST       Degrees Right AROM   Extension/Flexion +70/73  WNL   Radial/Ulnar Deviation   32/42  WNL   Forearm Pronation/Supination   62/80  Deficit/WNL   Supination with pain     STRENGTH    Pounds RIGHT LEFT    77.3 86   Lateral pinch 20 21   2 point pinch 16 14   3 jaw pinch 18 22     The affected extremity is 10.5 % weaker than the unaffected extremity.   (affected score/unaffected score, take the total and subtract from 100)      Sensibility: Normal     Edema: Min     Bilateral circumfirential measurements, wrists:       Right 18.9 cm, Left 18.5 cm  Variance of 0.4 cm - minimal edema       Skin Color: Normal  Skin/Scar condition Intact      Problems: Pain, ROM, Strength and Edema     Assessment:  Patient would benefit from skilled occupational therapy services in order to: Improve and Increase  ROM, Strength and Complaint of pain in order to decrease pain in UE for safe completion of ADLs and return to PLOF . Short Term Goals: (  6-7    Treatments)   1. Decrease Pain: Will have 0/10 pain with non-resistive to mildly resistive activity. 2. Increase AROM:    a. Right forearm pronation will be 75 or more degrees. b. Right forearm supination will be 80 or more degrees with 0/10 pain (WNL). 3. Increase strength (pounds): Right  strength will be 86 or more pounds, or symmetrical with the left  (WNL). 4. Increase function: UE Functional Index Score will be 78 or more points to promote increased functional abilities. 5. Decrease Edema: Bilateral circumfirential measurements of wrists will have a variance of 0.1 cm or less. 6. Patient to be independent with home exercise program as demonstrated by performance with correct form without cues. Long Term Goals: (  10-12  Treatments)  1. Decrease Pain: Will have 0/10 pain with mild to moderately resistive activity. 2.   Increase AROM:    a. Right forearm pronation will be 80 or more degrees (WNL). 4.  Increase function: UE Functional Index Score will be 80 points (WNL)for normal functional abilities. 5.   Decrease Edema: Bilateral circumfirential measurements of wrists will have a variance of 0. cm (WNL). Patient Goals: Make the pain go away and have avila range of motion. Treatment Potential: Good   Suggested Professional Referral: No  Domestic Concerns: No   Barriers to Goal Achievement: No    Comments/Assessment: Pt referred for right wrist extensor carpi ulnaris (ECU) tendonitis, specifically for ECU stretches and exercise.    Pt reports lifting weights at the gym and \"over loaded the wrist curls\" in November 2020. Pt reports progressive pain at that time, stopped exercises for a month and symptoms went away. He then went back to exercising and the symptoms returned \"but worse\". Pain: Pt reports pain with active forearm supination, all other wrist motions unremarkable. AROM: AROM of wrist and forearm WNL except forearm pronation which is 60 degrees (compared to 75 degrees on the contralateral left). Edema: There is mild edema measured at the wrist (0.4 cm variance, Rt versus Lt). Strength: The right  strength is currently 77.3 pounds, and is 10.5 % weaker than the contralateral left . Normally both  would be equal or up to 10 % stronger on the dominant side, depending on the individual's physical activity level. This therapist is requesting order for iontophoresis for pain management, see below. Home Program Initiated: Planned for next visit based on insurance guidelines   Comprehension of Education: NA at this time      Plans, Goals, Risks, Benefits, Discussed with and Patient    Treatment Plan:  Therapeutic Ex 48383, Manual 98254, HEP, Ultrasound [id number] and Iontophoresis (4mg/mL dexamethasone sodium phosphate 40-120mA min) 32473    [x]  Medication allergies reviewed for use of    Dexamethasone Sodium Phosphate 4mg/ml     with iontophoresis treatments. Pt is not allergic. Treatment Plan:  Frequency: 2 x/weeks for 12 visits     Today's Treatment:  Modalities:           Precautions: NA    Exercise Flow Sheet:  Exercise Reps/Time Weight/Level Comments   AROM - Right wrist/forearm   Planned for subsequent visit   Extensor carpi ulnaris (ECU) stretch   Planned for subsequent visit   Iontophoresis -Right ulnar wrist  (4mg/mL dexamethasone sodium phosphate 40-120mA min)  Pt is not allergic.   *Two patient identifiers confirmed*   Planned for subsequent visit   Non-resistive functional ex, progressing to resistive ex   Planned for subsequent visit               Other: NA    Specific Instructions for Next Treatment: See above exercise flow sheet. Evaluation Complexity:  History (Personal factors, comorbidities) [x]  0 []  1-2 []  3+   Exam (limitations, restrictions) [x]  1-2 []  3 []  4+   Decision Making [x]  Low []  Moderate []  High   ? [x]  Low Complexity []  Moderate   Complexity []  High Complexity        Treatment Charges: Mins Units Time In/Out   Evaluation  []  High  []  Moderate  [x]  Low  26  1 7161 - 2171   []  Ther Exercise      []  Manual Therapy      []  Ther Activities      []  Orthotic fit/train      []  Orthotic recheck      []        Total Treatment Time 26 min       Time In/Time Out: 1815 - 0546       Electronically signed by DIANA Renee/L, CHT on 8/16/2021 at 11:20 AM    Physician Signature: ____________________________________ Date: _______________  By signing above or cosigning this note, I have reviewed this plan of care and certify a need for medically necessary rehabilitation services. Requesting order for Iontophoresis: (4mg/mL dexamethasone sodium phosphate 40-120mA min). Maximum applications: 8. (for decreased inflammation and pain of extensor carpi ulnaris tendonitis, right ulnar wrist). If you concur:   *PLEASE SIGN ABOVE AND FAX BACK ALL PAGES* Thank you for your consideration.

## 2021-09-08 ENCOUNTER — HOSPITAL ENCOUNTER (OUTPATIENT)
Dept: OCCUPATIONAL THERAPY | Facility: CLINIC | Age: 21
Setting detail: THERAPIES SERIES
Discharge: HOME | End: 2021-09-08
Payer: COMMERCIAL

## 2021-09-08 PROCEDURE — 97110 THERAPEUTIC EXERCISES: CPT

## 2021-09-08 NOTE — FLOWSHEET NOTE
loaded the wrist curls\". Pt reported progressive pain, then didn't exercise for a month and symptoms went away. He went back to exercising and the symptoms returned \"but worse\". Treatment Charges: Mins Units Time In/Out   []  Modalities:       []  Ultrasound      [x]  Ther Exercise 36 2 1400 - 0641   []  Manual Therapy      []  Ther Activities      []  Orthotic fit/train      []  Orthotic recheck      []  Other      Total Treatment time 36 min       Assessment: [x] Progressing toward goals. Pt reports 0/10 pain on arrival, and at end of session. Pain with end range of supination     [] No change. [] Other     [x] Patient would continue to benefit from skilled occupational therapy services in order to: Improve and Increase  ROM, Strength and Complaint of pain in order to decrease pain in UE for safe completion of ADLs and return to PLOF . Short Term Goals: (  6-7    Treatments)   1. Decrease Pain: Will have 0/10 pain with non-resistive to mildly resistive activity. 2. Increase AROM:    a. Right forearm pronation will be 75 or more degrees. b. Right forearm supination will be 80 or more degrees with 0/10 pain (WNL). 3. Increase strength (pounds): Right  strength will be 86 or more pounds, or symmetrical with the left  (WNL). 4. Increase function: UE Functional Index Score will be 78 or more points to promote increased functional abilities. 5. Decrease Edema: Bilateral circumfirential measurements of wrists will have a variance of 0.1 cm or less. 6. Patient to be independent with home exercise program as demonstrated by performance with correct form without cues.     Long Term Goals: (  10-12  Treatments)  1. Decrease Pain: Will have 0/10 pain with mild to moderately resistive activity. 2.   Increase AROM:    a. Right forearm pronation will be 80 or more degrees (WNL). 4.  Increase function: UE Functional Index Score will be 80 points (WNL)for normal functional abilities.   5.   Decrease Edema: Bilateral circumfirential measurements of wrists will have a variance of 0. cm (WNL).       Pt. Education:  [x] Yes  [] No  [] Reviewed Prior HEP/Ed  Method of Education: [x] Verbal  [x] Demo  [x] Written  Re: AROM, Extensor carpi ulnaris (ECU) stretch   Comprehension of Education:  [x] Verbalizes understanding. [x] Demonstrates understanding. [] Needs review. [] Demonstrates/verbalizes HEP/Ed previously given. Plan: [x] Continue current frequency toward short and long term goals. [x] Specific Instructions for subsequent treatments: Progress non-resistive ex until fatigue not an issue, then progressively add strengthening ex as tolerated within pain limits.    [] Other:       Time In:/Time Out: 1400 - 2736        Electronically signed by:  Janice Miranda, OTR/L, CHT

## 2021-09-13 ENCOUNTER — HOSPITAL ENCOUNTER (OUTPATIENT)
Dept: OCCUPATIONAL THERAPY | Facility: CLINIC | Age: 21
Setting detail: THERAPIES SERIES
Discharge: HOME | End: 2021-09-13
Payer: COMMERCIAL

## 2021-09-13 PROCEDURE — 97110 THERAPEUTIC EXERCISES: CPT

## 2021-09-13 NOTE — FLOWSHEET NOTE
[] [facility]       Occupational Therapy            1st floor       [address], [place]         Phone: [phone]       Fax: [phone] [x] [facility] Occupational Therapy  [address] [place] [address]  Phone: [phone]  Fax: [phone]      Occupational Therapy Daily Treatment Note    Date:  2021  Patient Name:  Perry Mauricio    :  2000  MRN: [id number]  Referring Provider: Colt Mcneal MD  Insurance: [facility]      Medical Diagnosis: Right wrist tendinitis (M77.8)      Rehab Codes: Pain in wrist M25.53, stiffness in wrist M25.63,  muscle weakness generalized M62.81.      Onset Date: 20               Next Dr. Hannah Hightower: None scheduled  Cancels/ No Shows:  0/0     Visit# / Total Visits: 3/12;   2 times a week for 12 visits       Progress note due at visit 6-7    Cancels/No Shows: 0/0      Subjective:    Pain:  [] Yes  [x] No Location:  N/A Pain Rating: (0-10 scale) 0/10  Pain altered Tx:  [x] No  [] Yes  Action: NA  Pt Comments: No changes reported. Objective: Today's Treatment:  Modalities: Not authorized                                                                                    Precautions: NA                    Exercise Flow Sheet:  Exercise Reps/Time Weight/Level Comments   AROM - Right wrist/forearm 12 reps    Increased reps  Home ex reviewed, verbal and visual cues given until performed accurately. Extensor carpi ulnaris (ECU) stretch 5 reps    Completed  Home ex reviewed, verbal and visual cues given until performed accurately. Iontophoresis      Not authorized for treatment    Dexteroll   2 series 0 weight   Completed    Wrist maze 3 reps    Increased reps   Active pronation/Supination with cones 2 sets of   10 reps  Completed   with fatigue reported               Other: (onset date: 20) Was lifting weights at the gym and \"over loaded the wrist curls\".  Pt reported progressive pain, then didn't exercise for a month and symptoms went away. He went back to exercising and the symptoms returned \"but worse\". Treatment Charges: Mins Units Time In/Out   []  Modalities:       []  Ultrasound      [x]  Ther Exercise 30 3 1279 - 6642   []  Manual Therapy      []  Ther Activities      []  Orthotic fit/train      []  Orthotic recheck      []  Other      Total Treatment time 30 min       Assessment: [x] Progressing toward goals. Pt reports 0/10 pain on arrival, and at end of session. Momentary pain with end range of supination. Pt reports fatigue with AROM, especially radial/ulnar deviation and wrist flexion/extension. Verbal and visual cueing required to perform ex's accurately. Attempted to advance AROM ex, but pt tolerated fair to poorly; fatigue reported. No strengthening ex introduced at this time due to fatigue. Pt states he continues to work out at [employer], including upper extremity work. Pt advised this could aggravate current condition. Pt voices understanding. [] No change. [] Other     [x] Patient would continue to benefit from skilled occupational therapy services in order to: Improve and Increase  ROM, Strength and Complaint of pain in order to decrease pain in UE for safe completion of ADLs and return to PLOF . Short Term Goals: (  6-7    Treatments)   1. Decrease Pain: Will have 0/10 pain with non-resistive to mildly resistive activity. 2. Increase AROM:    a. Right forearm pronation will be 75 or more degrees. b. Right forearm supination will be 80 or more degrees with 0/10 pain (WNL). 3. Increase strength (pounds): Right  strength will be 86 or more pounds, or symmetrical with the left  (WNL). 4. Increase function: UE Functional Index Score will be 78 or more points to promote increased functional abilities. 5. Decrease Edema: Bilateral circumfirential measurements of wrists will have a variance of 0.1 cm or less.   6. Patient to be independent with home exercise program as demonstrated by performance with correct form without cues.     Long Term Goals: (  10-12  Treatments)  1. Decrease Pain: Will have 0/10 pain with mild to moderately resistive activity. 2.   Increase AROM:    a. Right forearm pronation will be 80 or more degrees (WNL). 4.  Increase function: UE Functional Index Score will be 80 points (WNL)for normal functional abilities. 5.   Decrease Edema: Bilateral circumfirential measurements of wrists will have a variance of 0. cm (WNL).       Pt. Education:  [] Yes  [] No  [x] Reviewed Prior HEP/Ed  Method of Education: [x] Verbal  [] Demo  [] Written  Re: Reviewed AROM, Extensor carpi ulnaris (ECU) stretch   Comprehension of Education:  [] Verbalizes understanding. [] Demonstrates understanding. [] Needs review. [x] Demonstrates/verbalizes HEP/Ed previously given. Plan: [x] Continue current frequency toward short and long term goals. [x] Specific Instructions for subsequent treatments: Progress non-resistive ex until fatigue not an issue, then progressively add strengthening ex as tolerated within pain limits.    [] Other:       Time In:/Time Out: [address] [facility]        Electronically signed by:  DIANA Baires/NANCY, CHT

## 2021-09-16 ENCOUNTER — HOSPITAL ENCOUNTER (OUTPATIENT)
Dept: OCCUPATIONAL THERAPY | Facility: CLINIC | Age: 21
Setting detail: THERAPIES SERIES
Discharge: HOME | End: 2021-09-16
Payer: COMMERCIAL

## 2021-09-16 PROCEDURE — 97110 THERAPEUTIC EXERCISES: CPT

## 2021-09-16 NOTE — FLOWSHEET NOTE
[] [facility]       Occupational Therapy            1st floor       [address] [place], [place]         Phone: [phone]       Fax: [phone] [x] [facility]  [address], [place]  Phone: [phone]  Fax: [phone]      Occupational Therapy Daily Treatment Note    Date:  2021  Patient Name:  Hannah Gaytan    :  2000  MRN: [id number]  Referring Provider: Markos Lovell MD  Insurance: [facility]      Medical Diagnosis: Right wrist tendinitis (M77.8)      Rehab Codes: Pain in wrist M25.53, stiffness in wrist M25.63,  muscle weakness generalized M62.81.      Onset Date: 20               Next Dr. Brittany Waldrop: None scheduled  Cancels/ No Shows:  0/0     Visit# / Total Visits: ;   2 times a week for 12 visits       Progress note due at visit 6-7    Cancels/No Shows: 0/0      Subjective:    Pain:  [] Yes  [x] No Location:  N/A Pain Rating: (0-10 scale) 0/10  Pain altered Tx:  [x] No  [] Yes  Action: NA  Pt Comments: No changes reported. Objective: Today's Treatment:  Modalities: Not authorized                                                                                    Precautions: NA                    Exercise Flow Sheet:  Exercise Reps/Time Weight/Level Comments   AROM - Right wrist/forearm 15 reps    Increased reps     Extensor carpi ulnaris (ECU) stretch 5 reps    Completed     Iontophoresis      Not authorized for treatment    Dexteroll   2 series 1 pound  Added resistance,   0/10 pain reported    Wrist maze 5 reps    Increased reps   Active pronation/Supination with cones 2 sets of   10 reps  Completed   with fatigue reported   Resistive  with  Hand exercisor   Added   Resistive pinch with thumb and ring/little fingers for wrist loading  Foam cubes and pinch pin 2 handfuls  Added   Other: (onset date: 20) Was lifting weights at the gym and \"over loaded the wrist curls\".  Pt reported progressive pain, then didn't exercise for a month and symptoms went away. He went back to exercising and the symptoms returned \"but worse\". Treatment Charges: Mins Units Time In/Out   []  Modalities:       []  Ultrasound      [x]  Ther Exercise 25 2 1100 - 1125   []  Manual Therapy      []  Ther Activities      []  Orthotic fit/train      []  Orthotic recheck      []  Other      Total Treatment time 30 min       Assessment: [x] Progressing toward goals. Pt reports 0/10 pain on arrival, and at end of session. No complaints of pain with end range of supination today. Pt reports fatigue with AROM, especially radial/ulnar deviation and wrist flexion/extension. Verbal and visual cueing required to perform ex's accurately (unchanged). Advanced AROM ex,  pt tolerated well, no fatigue reported. Gentle strengthening ex introduced with attention paid to fatigue, 0/10 pain reported. [] No change. [] Other     [x] Patient would continue to benefit from skilled occupational therapy services in order to: Improve and Increase  ROM, Strength and Complaint of pain in order to decrease pain in UE for safe completion of ADLs and return to PLOF . Short Term Goals: (  6-7    Treatments)   1. Decrease Pain: Will have 0/10 pain with non-resistive to mildly resistive activity. 2. Increase AROM:    a. Right forearm pronation will be 75 or more degrees. b. Right forearm supination will be 80 or more degrees with 0/10 pain (WNL). 3. Increase strength (pounds): Right  strength will be 86 or more pounds, or symmetrical with the left  (WNL). 4. Increase function: UE Functional Index Score will be 78 or more points to promote increased functional abilities. 5. Decrease Edema: Bilateral circumfirential measurements of wrists will have a variance of 0.1 cm or less. 6. Patient to be independent with home exercise program as demonstrated by performance with correct form without cues.     Long Term Goals: (  10-12  Treatments)  1. Decrease Pain: Will have 0/10 pain with mild to moderately resistive activity. 2.   Increase AROM:    a. Right forearm pronation will be 80 or more degrees (WNL). 4.  Increase function: UE Functional Index Score will be 80 points (WNL)for normal functional abilities. 5.   Decrease Edema: Bilateral circumfirential measurements of wrists will have a variance of 0. cm (WNL).       Pt. Education:  [] Yes  [] No  [x] Reviewed Prior HEP/Ed  Method of Education: [x] Verbal  [] Demo  [] Written  Re: Reviewed AROM, Extensor carpi ulnaris (ECU) stretch   Comprehension of Education:  [] Verbalizes understanding. [] Demonstrates understanding. [] Needs review. [x] Demonstrates/verbalizes HEP/Ed previously given. Plan: [x] Continue current frequency toward short and long term goals. [x] Specific Instructions for subsequent treatments: Progress non-resistive ex until fatigue not an issue, then progressively add strengthening ex as tolerated within pain limits.    [] Other:       Time In:/Time Out: 1100 - 1125        Electronically signed by:  DIANA Muñiz/L, CALEBT

## 2021-09-20 ENCOUNTER — HOSPITAL ENCOUNTER (OUTPATIENT)
Dept: OCCUPATIONAL THERAPY | Facility: CLINIC | Age: 21
Setting detail: THERAPIES SERIES
Discharge: HOME | End: 2021-09-20
Payer: COMMERCIAL

## 2021-09-20 PROCEDURE — 97110 THERAPEUTIC EXERCISES: CPT

## 2021-09-20 NOTE — FLOWSHEET NOTE
[] [facility]       Occupational Therapy            1st floor       [address], [place]         Phone: [phone]       Fax: [phone] [x] [facility]  [address] [place] [address]  Phone: [phone]  Fax: [phone]      Occupational Therapy Daily Treatment Note    Date:  2021  Patient Name:  Angely Anna    :  2000  MRN: [id number]  Referring Provider: Kavon Mackey MD  Insurance: [facility]      Medical Diagnosis: Right wrist tendinitis (M77.8)      Rehab Codes: Pain in wrist M25.53, stiffness in wrist M25.63,  muscle weakness generalized M62.81.      Onset Date: 20               Next Dr. Spence Brazil: None scheduled  Cancels/ No Shows:  0/0     Visit# / Total Visits: ;   2 times a week for 12 visits       Progress note due at visit 6-7    Cancels/No Shows: 0/0      Subjective:    Pain:  [] Yes  [x] No Location:  N/A Pain Rating: (0-10 scale) 0/10  Pain altered Tx:  [x] No  [] Yes  Action: NA  Pt Comments: No changes reported. Objective:   Today's Treatment:  Modalities: Not authorized                                                                                    Precautions: NA                    Exercise Flow Sheet:  Exercise Reps/Time Weight/Level Comments   AROM - Right wrist/forearm 20 reps    Increased reps     Extensor carpi ulnaris (ECU) stretch 5 reps    Completed     Iontophoresis      Not authorized for treatment    Dexteroll   2 series 1.5 pound  Increased resistance 1/2 pound    Wrist maze 6 reps    Increased reps   Active pronation/Supination with cones 2 sets of   10 reps  Completed   with fatigue reported   Resistive  with  Hand exercisor 50 reps 15 pounds Completed   Resistive pinch with thumb and ring/little fingers for wrist loading  Foam cubes and pinch pin 3 handfuls 4-6 pounds  (green/blue) Increased reps   Other: (onset date: 20) Was lifting weights at the gym and [employer] loaded the wrist curls\". Pt reported progressive pain, then didn't exercise for a month and symptoms went away. He went back to exercising and the symptoms returned \"but worse\". Treatment Charges: Mins Units Time In/Out   []  Modalities:       []  Ultrasound      [x]  Ther Exercise 23 2 1000 - 1023   []  Manual Therapy      []  Ther Activities      []  Orthotic fit/train      []  Orthotic recheck      []  Other      Total Treatment time 23 min       Assessment: [x] Progressing toward goals. Pt reports 0/10 pain on arrival, and at end of session. Complaints of pain with end range of supination today (declined). Pt reports fatigue with gripping, and pronation/supination today. Verbal and visual cueing required to perform ex's accurately (unchanged). Advanced AROM ex,  pt tolerated well, fatigue reported. Gentle strengthening ex advanced with attention paid to fatigue, 0/10 pain reported. [] No change. [] Other     [x] Patient would continue to benefit from skilled occupational therapy services in order to: Improve and Increase  ROM, Strength and Complaint of pain in order to decrease pain in UE for safe completion of ADLs and return to PLOF . Short Term Goals: (  6-7    Treatments)   1. Decrease Pain: Will have 0/10 pain with non-resistive to mildly resistive activity. 2. Increase AROM:    a. Right forearm pronation will be 75 or more degrees. b. Right forearm supination will be 80 or more degrees with 0/10 pain (WNL). 3. Increase strength (pounds): Right  strength will be 86 or more pounds, or symmetrical with the left  (WNL). 4. Increase function: UE Functional Index Score will be 78 or more points to promote increased functional abilities. 5. Decrease Edema: Bilateral circumfirential measurements of wrists will have a variance of 0.1 cm or less.   6. Patient to be independent with home exercise program as demonstrated by performance with correct form without cues.     Long Term Goals: (  10-12  Treatments)  1. Decrease Pain: Will have 0/10 pain with mild to moderately resistive activity. 2.   Increase AROM:    a. Right forearm pronation will be 80 or more degrees (WNL). 4.  Increase function: UE Functional Index Score will be 80 points (WNL)for normal functional abilities. 5.   Decrease Edema: Bilateral circumfirential measurements of wrists will have a variance of 0. cm (WNL).       Pt. Education:  [] Yes  [] No  [x] Reviewed Prior HEP/Ed  Method of Education: [x] Verbal  [] Demo  [] Written  Re: Reviewed AROM, Extensor carpi ulnaris (ECU) stretch   Comprehension of Education:  [] Verbalizes understanding. [] Demonstrates understanding. [] Needs review. [x] Demonstrates/verbalizes HEP/Ed previously given. Plan: [x] Continue current frequency toward short and long term goals. [x] Specific Instructions for subsequent treatments: Progress non-resistive ex until fatigue not an issue, then progressively add strengthening ex as tolerated within pain limits.    [] Other:       Time In:/Time Out: 2924 - 3773        Electronically signed by:  DIANA Santamaria/TIFFANI CRUZ

## 2021-09-22 ENCOUNTER — HOSPITAL ENCOUNTER (OUTPATIENT)
Dept: OCCUPATIONAL THERAPY | Facility: CLINIC | Age: 21
Setting detail: THERAPIES SERIES
Discharge: HOME | End: 2021-09-22
Payer: COMMERCIAL

## 2021-09-22 PROCEDURE — 97110 THERAPEUTIC EXERCISES: CPT

## 2021-09-22 NOTE — DISCHARGE SUMMARY
[] [facility]       Occupational Therapy 1st floor       [address], [address]         Phone: [phone]       Fax: [phone] [x] [facility]  [address] [facility], [address]  Phone: [phone]  Fax: [phone]       Occupational Therapy Hand & Upper Extremity  Discharge Note    Date: 2021      Patient: Jeovany Barrios  : 2000  MRN: [id number]  Referring Provider: Adonis Glaesr MD  Insurance: [facility]     Medical Diagnosis: Right wrist tendinitis (M77.8)   Rehab Codes: Pain in wrist M25.53, stiffness in wrist M25.63,  muscle weakness generalized M62.81. Onset Date: 20    Next Dr. Yecenia Rodriguez: None scheduled  #Visits/Total Visits: 6/12  2 times a week for 12 visits  Cancels/ No Shows:  0/0  Total visits attended:  6  Date of initial visit: 21                Date of final visit: 21    Subjective:   CC:  Pain and decreased wrist motion  HPI: (onset date: 20) Was lifting weights at the gym and \"over loaded the wrist curls\". Pt reported progressive pain, then didn't exercise for a month and symptoms went away. He went back to exercising and the symptoms returned \"but worse\". Surgery Date: NA None    Involved Extremity: Right   Dominant Extremity: Right    Past Medical History: Unremarkable          Comorbidities: NA    Medications: Refer to Medical chart in Epic Allergies:  Bee stings - no Epi pen, latex, Cefixime, strawberry flavor    Pain: Intensity:   0/10 (improved 2 levels) Location: NA  Pain Type: NA  Pain Altered Tx: no  Action Taken:none         Objective: Tests/Measurements: Upper Extremity Functional Index  Current Functional Level:  78 points (improved 3 points),  mildly functionally impaired as measured with the Upper Extremity Functional Index Survey.   0-80 scale, with 80 = no Deficits  (The UEFI model does not provide any specific cut off points that could classify the upper limb disability degree, however, a minimal detectable change of 9 points is provided. This means that for improvement or deterioration to be considered, between two subsequent evaluations, the scores must differ by at least 9 points.)    AROM:  WRIST       Degrees Right AROM   Extension/Flexion +78/73  WNL   Radial/Ulnar Deviation   32/42  WNL   Forearm Pronation/Supination   76/80  incr/WNL   0/10 pain with supination     STRENGTH    Pounds RIGHT LEFT    87.6 82   Lateral pinch 22 20   2 point pinch 16 12   3 jaw pinch 19 16     The affected extremity is 6.8 % weaker than the unaffected extremity. (affected score/unaffected score, take the total and subtract from 100)      Sensibility: Normal     Edema: resolved at wrist       Skin Color: Normal  Skin/Scar condition Intact      Problems: Pain, ROM, Strength and Edema     Assessment:  Patient would benefit from skilled occupational therapy services in order to: Improve and Increase  ROM, Strength and Complaint of pain in order to decrease pain in UE for safe completion of ADLs and return to PLOF . Short Term Goals: (  6-7    Treatments)   1. Decrease Pain: Will have 0/10 pain with non-resistive to mildly resistive activity. MET  2. Increase AROM:    a. Right forearm pronation will be 75 or more degrees. MET  b. Right forearm supination will be 80 or more degrees with 0/10 pain (WNL). MET  3. Increase strength (pounds): Right  strength will be 86 or more pounds, or symmetrical with the left  (WNL). MET  4. Increase function: UE Functional Index Score will be 78 or more points to promote increased functional abilities. MET  5. Decrease Edema: Bilateral circumfirential measurements of wrists will have a variance of 0.1 cm or less. MET  6. Patient to be independent with home exercise program as demonstrated by performance with correct form without cues. MET    Long Term Goals: (  10-12  Treatments)   1.    Decrease Pain: Will have 0/10 pain with mild to Pt to continue exercise/home instructions independently. [] Therapy interrupted due to:    [] Pt has 2 or more no shows/cancels, is discontinued per our policy. [] Pt has completed prescribed number of treatment sessions. [] Other:      Electronically signed by DIANA Baum/L, CHT on 9/22/2021 at 1:06 PM    If you have any questions or concerns, please don't hesitate to call. Thank you for your referral.      Today's Treatment:  Jose Hudson                                                                                   MIRGCNHCKNF: LESLEY                    Exercise Flow Sheet:  Exercise Reps/Time Weight/Level Comments   AROM - Right wrist/forearm 20 reps    Reviewed home ex      Extensor carpi ulnaris (ECU) stretch 5 reps    Reviewed home ex      Iontophoresis      Not authorized for treatment    Dexteroll   2 series 1.5 pound  Discontinued    Wrist maze 6 reps    Discontinued   Active pronation/Supination with cones 2 sets of   10 reps   Reviewed home ex prgram   Resistive  with  Hand exercisor 50 reps 15 pounds Discontinued   Resistive pinch with thumb and ring/little fingers for wrist loading  Foam cubes and pinch pin 3 handfuls 4-6 pounds  (green/blue) Discontinued   Resistive /pinches with putty 10 reps [place] Added for home ex program   Other: (onset date: 11/01/20) Was lifting weights at the gym and \"over loaded the wrist curls\".        Treatment Charges: Mins Units Time In/Out   [x]  Ther Exercise 39 6 2169 - 7002   []  Manual Therapy      []  Ther Activities      []  Orthotic fit/train      []  Orthotic recheck      []        Total Treatment Time 39 min       Time In/Time Out: 5001 - 2611

## 2021-09-29 ENCOUNTER — APPOINTMENT (OUTPATIENT)
Dept: OCCUPATIONAL THERAPY | Facility: CLINIC | Age: 21
End: 2021-09-29
Payer: COMMERCIAL

## 2021-09-30 ENCOUNTER — APPOINTMENT (OUTPATIENT)
Dept: OCCUPATIONAL THERAPY | Facility: CLINIC | Age: 21
End: 2021-09-30
Payer: COMMERCIAL

## 2022-06-03 ENCOUNTER — HOSPITAL ENCOUNTER (OUTPATIENT)
Dept: PHYSICAL THERAPY | Facility: CLINIC | Age: 22
Setting detail: THERAPIES SERIES
Discharge: HOME | End: 2022-06-03
Payer: COMMERCIAL

## 2022-06-03 PROCEDURE — 97110 THERAPEUTIC EXERCISES: CPT

## 2022-06-03 PROCEDURE — 97161 PT EVAL LOW COMPLEX 20 MIN: CPT

## 2022-06-03 NOTE — CONSULTS
[] [facility]) - [facility] &  Therapy  [address]  [phone]  F: [phone] [] [address]  [place] [address]   [address]  P: [phone]  F: [phone] [] [address] &  Therapy  [address]  P: [phone]  F: [phone] [] [address]  [facility]   [address]   [place]: [phone]  F: [phone]  [x] [address]  P: [phone]  F: [phone]      Physical Therapy Spine Evaluation    Date:  6/3/2022  Patient: Osorio Devine  :  2000  MRN: [id number]  Physician: Angel Samson  Insurance: Emile Rivera after [facility]  Medical Diagnosis: strain of rhomboid  Rehab Codes: S29.012A  Onset date: May 31, 2022  Next Dr's appt.: TBD    Subjective:   CC: Pt arrives   Pt was doing a chest supported row while working out and he felt a pull in his rhomboid. Pt states he thinks he was a bit dehydrated and did not warm up properly that day. He also states he had felt tightness in the area prior to the injury. Pt has been prescribed muscle relaxer's and steroids for pain and has also been using a topical cream, all of which he notes an improvement in symptoms with. HPI: Pt experienced a similar experience about a year ago and states the pain linger for a bit but eventually went away.     PMHx: [] Unremarkable [] Diabetes [] HTN  [] Pacemaker   [] MI/Heart Problems [] Cancer [] Arthritis [] Other:              [x] Refer to full medical chart  In EPIC         Tests: [] X-Ray: [] MRI:  [] Other:    Medications: [x] Refer to full medical record [] None [] Other:  Allergies:      [x] Refer to full medical record [] None [] Other:        Marital Status    Home type    Stairs from outside            [employer] inside Hand rail    Employment  at [employer] [address] status Part-time   Work Activities/duties  Reaching, bending over   Recreational Activities Working out, typically 4-5 days mainly upper body       Pain present? no   Location Medial L shoulder blade   Pain Rating currently 0/10   Pain at worse 9/10 when first happened, lasted 1 1/2 days   Pain at best 0/10   Description of pain aching   Altered Sensation none   What makes it worse Reaching, keeps elbow at side   What makes it better Medications, sleeping flat   Symptom progression Getting better   Sleep Impacted when first injured but no longer is             Objective:      STRENGTH  STRENGTH  ROM    Left Right  Left Right Cervical    C5 Shld Abd 5/5 5/5 L1-2 Hip Flex   Flexion    Shld Flexion 5/5 5/5 Hip Abd   Extension    Shld IR 5/5 5/5 L3-4 Knee Ext   Rotation L R   Shld ER 5/5 5/5 L4 Ankle DF   Sidebend L R   C6 Elb Flex   L5 EHL   Retraction    C7 Elb Ext   S1 Plant. Flex   Lumbar    C8 EPL   Abdominals   Flexion    T1 Fing Abd   Erector Spinae   Extension          Rotation L  R   Mid trap 4/5 4+/5    Sidebend L R         UE/LE           OBSERVATION No Deficit Deficit Not Tested Comments   Posture       Forward Head [] [] []    Rounded Shoulders [] [] []    Kyphosis [] [] []    Lordosis [] [] []    Leg Length Discrp [] [] []    Slumped Sitting [] [] []    Palpation [] [x] [] Slight TTP along L sided rhomboid, mid trap   Sensation [] [] []    Edema [] [] []    Neurological [] [] []                Functional Test: Upper Extremity Functional Scale Score: 37% functionally impaired         Assessment:   Patient presents with signs and symptoms consistent with L rhomboid strain, however has a significant improvement in pain since injury onset. Patient requests HEP and instructions on how to ease back into regular lifting routine. Patient voiced understanding and agreement. Also demonstrated stretching to perform in order to prevent future injuries. Will leave chart open in case patient experiences pain while returning to lifting. Goals:        STG: (to be met in 5 treatments)  1. ? Pain: Decrease pain levels to 0/10 while working out  2. Improve score on assessment tool from 37% impaired to 15% impaired, demonstrating an improvement in ADLs                     Patient goals: Return to lifting without pain     Rehab Potential:  [x] Good  [] Fair  [] Poor   Suggested Professional Referral:  [x] No  [] Yes:  Barriers to Goal Achievement[de-identified]  [x] No  [] Yes:  Domestic Concerns:  [x] No  [] Yes:    Pt. Education:  [x] Plans/Goals, Risks/Benefits discussed  [x] Home exercise program    Method of Education: [x] Verbal  [x] Demo  [x] Written  Comprehension of Education:  [x] Verbalizes understanding. [x] Demonstrates understanding. [x] Needs Review. [] Demonstrates/verbalizes understanding of HEP/Ed previously given. Treatment Plan:  [x] Therapeutic Exercise (59794 )             [] Aquatic Therapy (53638)  [x] Manual Therapy (11610)              [] Electrical Stimulation- Unattended (40533)  [] Integrative Dry Needling                                      [] Electrical Stimulation- Attended (93802)  [x] Instruction in HEP                             [] Lumbar/Cervical Traction (39685)  [] Neuromuscular Re-education (04903)            [x] Cold/hotpack    [x] Vasocompression (25513)                                   [] Ultrasound (32305)                      [x] Gait Training (85830)                                                          [] Therapeutic Activity (21003)               [] Iontophoresis (28859): 4 mg/mL Dexamethasone Sodium Phosphate 40-80 mAmin            []  Medication allergies reviewed for use of    Dexamethasone Sodium Phosphate 4mg/ml     with iontophoresis treatments. Pt is not allergic.     Frequency:  1 x/week for 5 visits    Todays Treatment:    Exercises:  Exercise     Reps/ Time Weight/ Level Comments   Self thoracic mob via foam roller x     Mid back stretch x     Mid row, shoulder ext, horizontal abd  2x10 blue                                                                            Other: Patient able to complete all exercises without any increase in pain    Manual: Instrument assisted MFR via hypervolt to R sided medial scapular border    Specific Instructions for next treatment: Pt to continue exercises as HEP, will call if pain returns     Evaluation Complexity:  History (Personal factors, comorbidities) [x] 0 [] 1-2 [] 3+   Exam (limitations, restrictions) [x] 1-2 [] 3 [] 4+   Clinical presentation (progression) [x] Stable [] Evolving  [] Unstable   Decision Making [x] Low [] Moderate [] High    [x] Low Complexity [] Moderate Complexity [] High Complexity       Treatment Charges: Mins Units   [x] Evaluation       [x]  Low       []  Moderate       []  High 15 1   []  Modalities     [x]  Ther Exercise 15 1   []  Manual Therapy     []  Ther Activities     []  Aquatics     []  Vasocompression     []  Other       TOTAL TREATMENT TIME: 30 minutes    Time in: 0810   Time Out: 0840    Electronically signed by: Catalino Chan PT        Physician Signature:________________________________Date:__________________  By signing above or cosigning this note, I have reviewed this plan of care and certify a need for medically necessary rehabilitation services.      *PLEASE SIGN ABOVE AND FAX BACK ALL PAGES*

## 2025-03-21 ENCOUNTER — HOSPITAL ENCOUNTER (OUTPATIENT)
Dept: PHYSICAL THERAPY | Facility: CLINIC | Age: 25
Setting detail: THERAPIES SERIES
Discharge: HOME | End: 2025-03-21
Payer: COMMERCIAL

## 2025-03-21 PROCEDURE — 97110 THERAPEUTIC EXERCISES: CPT

## 2025-03-21 PROCEDURE — 97161 PT EVAL LOW COMPLEX 20 MIN: CPT

## 2025-03-21 NOTE — CONSULTS
[facility]  Outpatient Rehabilitation &  Therapy  [address]   P: [phone]  F: [phone]        Physical Therapy Spine Evaluation    Date:  3/21/2025  Patient: Isaac Us   : 2000  MRN: [id number]  Referring Provider: Dr Juan Johnson DO  Insurance: [facility] (30 v)  Medical Diagnosis:   M99.03 (ICD-10-CM) - Segmental and somatic dysfunction of lumbar region   M99.01 (ICD-10-CM) - Segmental and somatic dysfunction of cervical region   M99.08 (ICD-10-CM) - Segmental and somatic dysfunction of rib cage   M99.02 (ICD-10-CM) - Segmental and somatic dysfunction of thoracic region   G56.80 (ICD-10-CM) - Other specified mononeuropathies of unspecified upper limb   Rehab Codes: M62.81 (Muscle Weakness), M62.9 (Disorder of Muscle), M79.1 (Myalgia), Z73.6   Onset Date:     Next 's appt.:  5 weeks from last visit recommended       Subjective:   CC/HPI: Pt is a 25 yr old male with lower back and neck pain that started in . Pain has been on and off, gets worse with activity. Recently worse with weightlifting. Left side midback into scapula and into thoracic spine. No radicular symptoms, no neck pain noted.      He has seen ortho, sports med and PT at [place] at [facility] for pain relief. MRI (-). Saw Dr Johnson, he has had manual treatment with no long-term relief. He feels his mobility is limited in the left shoulder with exercises.     PMHx:              [x] Refer to full medical chart  In [facility]         Radiology: Results:     [x] X-RAY   Impression:  1. Relatively normal four view x-rays left shoulder    [x] MRI  -  Anterior superior labral abnormal signal which is nonspecific.  Consider MR arthrography if you'd like to assess for labral tear or anterior capsular instability.    [] Other         Medications: [x] Refer to full medical record [] None [] Other:  Allergies:      [x] Refer to full medical record [] None [] Other:    ADL/IADL [x] Previously

## 2025-03-27 ENCOUNTER — HOSPITAL ENCOUNTER (OUTPATIENT)
Dept: PHYSICAL THERAPY | Facility: CLINIC | Age: 25
Setting detail: THERAPIES SERIES
Discharge: HOME | End: 2025-03-27
Payer: COMMERCIAL

## 2025-03-27 PROCEDURE — 97110 THERAPEUTIC EXERCISES: CPT

## 2025-03-27 NOTE — FLOWSHEET NOTE
[facility]  Outpatient Rehabilitation &  Therapy  [address] [facility]   [address]   P: [phone]  F: [phone]      Physical Therapy Daily Treatment Note    Date:  3/27/2025  Patient Name:  Isaac Us    :  2000  MRN: [id number]  Physician: Juan Johnson DO  Insurance: [facility] (30 v)     Medical Diagnosis:   M99.03 (ICD-10-CM) - Segmental and somatic dysfunction of lumbar region   M99.01 (ICD-10-CM) - Segmental and somatic dysfunction of cervical region   M99.08 (ICD-10-CM) - Segmental and somatic dysfunction of rib cage   M99.02 (ICD-10-CM) - Segmental and somatic dysfunction of thoracic region   G56.80 (ICD-10-CM) - Other specified mononeuropathies of unspecified upper limb   Rehab Codes: M62.81 (Muscle Weakness), M62.9 (Disorder of Muscle), M79.1 (Myalgia), Z73.6   Onset Date:                                Next 's appt.:  5 weeks from last visit recommended   Visit# / total visits:      Cancels/No Shows: 0/0        Subjective:    Pain:  [x] Yes  [] No Location: left side   Pain Rating: (0-10 scale) 3/10  Pain altered Tx:  [x] No  [] Yes  Action:  Comments: Patient with less overall tightness. Reports adherence to HEP.         Objective:    Precautions: Standard  Exercise       Reps/ Time Weight/ Level Comments             Bike UE  warm-up                 Upper Trap Stretch  30\"x3   HEP   Levator Scap Stretch  30\"x3   HEP   Shoulder Retractions  30\"x3   HEP   Corner Pec Stretch  30\"x3   HEP   Doorway Y stretch  30\"x3        Pec roller supine stretch   30\"x3             Resistance Band         Retraction  x20 Green       Ext  x20 Green      ER  x20 Green      IR  x20 Green       HAB                   Prone program         Retraction  x20   HEP   Depression  x20   HEP   Extension  x20       HAB  x20       Scaption                   Supine program         Punches         ABCs         SL ER                   Wall pushups                            Access Code:

## 2025-04-03 ENCOUNTER — HOSPITAL ENCOUNTER (OUTPATIENT)
Dept: PHYSICAL THERAPY | Facility: CLINIC | Age: 25
Setting detail: THERAPIES SERIES
Discharge: HOME | End: 2025-04-03
Payer: COMMERCIAL

## 2025-04-03 PROCEDURE — 97110 THERAPEUTIC EXERCISES: CPT

## 2025-04-03 NOTE — FLOWSHEET NOTE
[facility]  Outpatient Rehabilitation &  Therapy  [address] [facility]   [address]   P: [phone]  F: [phone]      Physical Therapy Daily Treatment Note    Date:  4/3/2025  Patient Name:  Isaac Us    :  2000  MRN: [id number]  Physician: Juan Johnson DO  Insurance: [facility] (30 v)     Medical Diagnosis:   M99.03 (ICD-10-CM) - Segmental and somatic dysfunction of lumbar region   M99.01 (ICD-10-CM) - Segmental and somatic dysfunction of cervical region   M99.08 (ICD-10-CM) - Segmental and somatic dysfunction of rib cage   M99.02 (ICD-10-CM) - Segmental and somatic dysfunction of thoracic region   G56.80 (ICD-10-CM) - Other specified mononeuropathies of unspecified upper limb   Rehab Codes: M62.81 (Muscle Weakness), M62.9 (Disorder of Muscle), M79.1 (Myalgia), Z73.6   Onset Date:                                Next 's appt.:  5 weeks from last visit recommended     Visit# / total visits: 3/20     Cancels/No Shows: 0/0        Subjective:    Pain:  [] Yes  [x] No Location: left side   Pain Rating: (0-10 scale) 0/10  Pain altered Tx:  [x] No  [] Yes  Action:  Comments: Patient arrived noting no pain noting continued improvements in symptoms.      Objective:    Precautions: Standard  Exercise       Reps/ Time Weight/ Level Comments             Bike UE  warm-up                 Upper Trap Stretch  30\"x3   HEP   Levator Scap Stretch  30\"x3   HEP   Shoulder Retractions  30\"x3   HEP   Corner Pec Stretch  30\"x3   HEP   Doorway Y stretch  30\"x3        Pec roller supine stretch   30\"x3             Resistance Band         Retraction  x20 Blue     Ext  x20 Blue     ER  x20 Blue     IR  x20 Blue     HAB                   Prone program         Row  x20  1# HEP   Extension  x20  1#     HAB  x20  1#     Scaption  x20  1#               Standing      Flexion  x20  1#    Scaption  x20  1#    Abduction  x20  1#          Supine program         Punches         ABCs         SL ER

## 2025-04-10 ENCOUNTER — HOSPITAL ENCOUNTER (OUTPATIENT)
Dept: PHYSICAL THERAPY | Facility: CLINIC | Age: 25
Setting detail: THERAPIES SERIES
Discharge: HOME | End: 2025-04-10
Payer: COMMERCIAL

## 2025-04-10 PROCEDURE — 97110 THERAPEUTIC EXERCISES: CPT

## 2025-04-10 NOTE — FLOWSHEET NOTE
[facility]  Outpatient Rehabilitation &  Therapy  [address]   [address]   P: [phone]  F: [phone]      Physical Therapy Daily Treatment Note    Date:  4/10/2025  Patient Name:  Isaac Us    :  2000  MRN: [id number]  Physician: Juan Johnson DO  Insurance: [facility] (30 v)     Medical Diagnosis:   M99.03 (ICD-10-CM) - Segmental and somatic dysfunction of lumbar region   M99.01 (ICD-10-CM) - Segmental and somatic dysfunction of cervical region   M99.08 (ICD-10-CM) - Segmental and somatic dysfunction of rib cage   M99.02 (ICD-10-CM) - Segmental and somatic dysfunction of thoracic region   G56.80 (ICD-10-CM) - Other specified mononeuropathies of unspecified upper limb   Rehab Codes: M62.81 (Muscle Weakness), M62.9 (Disorder of Muscle), M79.1 (Myalgia), Z73.6   Onset Date:                                Next 's appt.:  5 weeks from last visit recommended   Visit# / total visits:      Cancels/No Shows: 0/0          Subjective:    Pain:  [] Yes  [x] No Location: left side   Pain Rating: (0-10 scale) 2/10  Pain altered Tx:  [x] No  [] Yes  Action:  Comments: Patient arrived noting no pain, feeling better overall     Objective:    Precautions: Standard  Exercise     Midback pain  Reps/ Time Weight/ Level Comments             Bike UE  warm-up                 Upper Trap Stretch  30\"x3   HEP   Levator Scap Stretch  30\"x3   HEP   Shoulder Retractions  30\"x3   HEP   Corner Pec Stretch  30\"x3   HEP   Doorway Y stretch  30\"x3        Pec roller supine stretch   30\"x3             Resistance Band         Retraction  x20 Blue     Ext  x20 Blue     ER  x20 Blue     IR  x20 Blue     HAB  x20 Blue                 Prone program         Row  x20  3# HEP   Extension  x20  3#     HAB  x20  3#     Scaption  x20  3#               Standing      Flexion  x20  3#    Scaption  x20  3#    Abduction  x20  3#          Supine program         Punches  x20    HEP   ABCs  x20    HEP   SL ER

## 2025-04-17 ENCOUNTER — HOSPITAL ENCOUNTER (OUTPATIENT)
Dept: PHYSICAL THERAPY | Facility: CLINIC | Age: 25
Setting detail: THERAPIES SERIES
Discharge: HOME | End: 2025-04-17
Payer: COMMERCIAL

## 2025-04-17 PROCEDURE — 97110 THERAPEUTIC EXERCISES: CPT

## 2025-04-17 NOTE — FLOWSHEET NOTE
Quality 226: Preventive Care And Screening: Tobacco Use: Screening And Cessation Intervention: Patient screened for tobacco use and is an ex/non-smoker
Quality 431: Preventive Care And Screening: Unhealthy Alcohol Use - Screening: Patient not identified as an unhealthy alcohol user when screened for unhealthy alcohol use using a systematic screening method
Detail Level: Detailed
Quality 110: Preventive Care And Screening: Influenza Immunization: Influenza immunization was not ordered or administered, reason not given
goals.          Time In: 1500          Time Out: 1550    Electronically signed by:  Jak Caldera PT

## 2025-04-24 ENCOUNTER — HOSPITAL ENCOUNTER (OUTPATIENT)
Dept: PHYSICAL THERAPY | Facility: CLINIC | Age: 25
Setting detail: THERAPIES SERIES
Discharge: HOME | End: 2025-04-24
Payer: COMMERCIAL

## 2025-04-24 PROCEDURE — 97110 THERAPEUTIC EXERCISES: CPT

## 2025-04-24 NOTE — FLOWSHEET NOTE
Carry 3 laps 8# L UE   Pushup+ 10x           Supine program         Punches  x20  8#  HEP   ABCs  x20  8#  HEP   Sidelying ER  x20  8#  HEP   Chest flies   x20  8#                                    Access Code: [email or web address]    Treatment Charges: Mins Units   Ther Exercise 50 3   Manual Therapy     Vasocompression              Total Billable time 50 3       Assessment: [x] Progressing toward goals. Patient demonstrates fair tolerance to exercise progressions including increased resistance band and dumbbell weight and added overhead carry and pushup plus to improve scapular stability and serratus anterior strength.     [] No change.     [] Other:  [x] Patient would benefit from skilled physical therapy services in order to improve ROM, flexibility, strength and decrease pain. Patient with limitations as noted in the objective section of the eval above. Plan to address limitations in flexibility, strength and somatic dysfunctions.          STG/LTG:    Goals  MET NOT MET ON-  GOING  Details   Date Addressed:            STG: To be met in 10 treatments            1. ? Pain: Decrease pain levels to 3/10 with ADLs []  []  []      2. ? ROM: Increase flexibility limitations throughout to equal bilat to reduce difficulty with ADLs []  []  []      3. ? Strength: Increase MMT to 5/5 throughout to ease functional limitations and mobility  []  []  []      4. Independent with Home Exercise Programs []  []  []        []  []  []      Date Addressed:            LTG: To be met in 20 treatments           1. Improve score on assessment tool Oswestry from 26% impairment to less than 10% impairment  []  []  []      2. Reduce pain levels to 1/10 or less with ADLs []  []  []        []  []  []                        Patient goals: decrease pain            Education      Yes No    Patient Education Provided today  [x]  []     Reviewed established HEP  [x]  []           Comprehension of Education:         Verbalizes Understanding  [x]  []

## 2025-04-30 ENCOUNTER — HOSPITAL ENCOUNTER (OUTPATIENT)
Dept: PHYSICAL THERAPY | Facility: CLINIC | Age: 25
Setting detail: THERAPIES SERIES
Discharge: HOME | End: 2025-04-30
Payer: COMMERCIAL

## 2025-04-30 PROCEDURE — 97110 THERAPEUTIC EXERCISES: CPT

## 2025-04-30 NOTE — FLOWSHEET NOTE
[facility]  Outpatient Rehabilitation &  Therapy  [address]   [address]   P: [phone]  F: [phone]      Physical Therapy Daily Treatment Note    Date:  2025  Patient Name:  Isaac Us    :  2000  MRN: [id number]  Physician: Juan Johnson DO  Insurance: [facility] (30 v)     Medical Diagnosis:   M99.03 (ICD-10-CM) - Segmental and somatic dysfunction of lumbar region   M99.01 (ICD-10-CM) - Segmental and somatic dysfunction of cervical region   M99.08 (ICD-10-CM) - Segmental and somatic dysfunction of rib cage   M99.02 (ICD-10-CM) - Segmental and somatic dysfunction of thoracic region   G56.80 (ICD-10-CM) - Other specified mononeuropathies of unspecified upper limb   Rehab Codes: M62.81 (Muscle Weakness), M62.9 (Disorder of Muscle), M79.1 (Myalgia), Z73.6   Onset Date:                                Next 's appt.:  5 weeks from last visit recommended     Visit# / total visits:      Cancels/No Shows: 0/0      Subjective:    Pain:  [] Yes  [x] No Location: left side   Pain Rating: (0-10 scale) 2/10  Pain altered Tx:  [x] No  [] Yes  Action:  Comments: Patient arrived noting no pain and no new issues to report.    Objective:    Precautions: Standard  Exercise     Midback pain  Reps/ Time Weight/ Level Comments             Bike UE  warm-up                 Upper Trap Stretch  30\"x3   HEP   Levator Scap Stretch  30\"x3   HEP   Shoulder Retractions  30\"x3   HEP   Corner Pec Stretch  30\"x3   HEP   Doorway Y stretch  30\"x3        Pec roller supine stretch   30\"x3             Resistance Band         Retraction  x20 grey     Ext  x20 grey     ER  x20 grey     IR  x20 grey     HAB  x20 grey               Prone program         Row  x20  10# HEP   Extension  x20  10#     HAB  x20  5#     Scaption  x20  5#               Standing      Flexion  x20  8#    Scaption  x20  8#    Abduction x20  8#    Shoulder press x20           Turf      Overhead Carry 3 laps 8# L UE   Pushup+

## 2025-05-08 ENCOUNTER — HOSPITAL ENCOUNTER (OUTPATIENT)
Dept: PHYSICAL THERAPY | Facility: CLINIC | Age: 25
Setting detail: THERAPIES SERIES
Discharge: HOME | End: 2025-05-08
Payer: COMMERCIAL

## 2025-05-08 PROCEDURE — 97110 THERAPEUTIC EXERCISES: CPT

## 2025-05-08 NOTE — FLOWSHEET NOTE
[facility]  Outpatient Rehabilitation &  Therapy  [address]   [address]   P: [phone]  F: [phone]      Physical Therapy Daily Treatment Note    Date:  2025  Patient Name:  Isaac Us    :  2000  MRN: [id number]  Physician: Juan Johnson DO  Insurance: [facility] (30 v)     Medical Diagnosis:   M99.03 (ICD-10-CM) - Segmental and somatic dysfunction of lumbar region   M99.01 (ICD-10-CM) - Segmental and somatic dysfunction of cervical region   M99.08 (ICD-10-CM) - Segmental and somatic dysfunction of rib cage   M99.02 (ICD-10-CM) - Segmental and somatic dysfunction of thoracic region   G56.80 (ICD-10-CM) - Other specified mononeuropathies of unspecified upper limb   Rehab Codes: M62.81 (Muscle Weakness), M62.9 (Disorder of Muscle), M79.1 (Myalgia), Z73.6   Onset Date:                                Next 's appt.:  5 weeks from last visit recommended     Visit# / total visits:      Cancels/No Shows: 0/0      Subjective:    Pain:  [] Yes  [x] No Location: left side   Pain Rating: (0-10 scale) 0/10  Pain altered Tx:  [x] No  [] Yes  Action:  Comments: Patient arrived noting no pain and no new issues to report. A bit of tension reported by patient in internal rotation with resistance.     Objective:    Precautions: Standard  Exercise     Midback pain  Reps/ Time Weight/ Level Comments             Bike UE  warm-up                 Upper Trap Stretch  30\"x3   HEP   Levator Scap Stretch  30\"x3   HEP   Shoulder Retractions  30\"x3   HEP   Corner Pec Stretch  30\"x3   HEP   Doorway Y stretch  30\"x3        Pec roller supine stretch   30\"x3             Cables         Retraction  2x10 84 lbs  2 handles, 1 cable   Ext  x20 60 lbs  Rope, 1 cable   ER 2x10 24 lbs  1 handle, 1 cable   IR 2x10 48 lbs  1 handle, 1 cable   HAB  x20 24 lbs  1 handle, 1 cable             Prone program         Row  x20  10# HEP   Extension  x20  10#     HAB  x20  5#     Scaption  x20  5#

## 2025-05-15 ENCOUNTER — HOSPITAL ENCOUNTER (OUTPATIENT)
Dept: PHYSICAL THERAPY | Facility: CLINIC | Age: 25
Setting detail: THERAPIES SERIES
Discharge: HOME | End: 2025-05-15
Payer: COMMERCIAL

## 2025-05-15 PROCEDURE — 97110 THERAPEUTIC EXERCISES: CPT

## 2025-05-15 NOTE — FLOWSHEET NOTE
[facility]  Outpatient Rehabilitation &  Therapy  [address]   [address]   P: [phone]  F: [phone]      Physical Therapy Daily Treatment Note    Date:  5/15/2025  Patient Name:  Isaac Us \"May-\"   :  2000  MRN: [id number]  Physician: Juan Johnson DO  Insurance: [facility] (30 v)     Medical Diagnosis:   M99.03 (ICD-10-CM) - Segmental and somatic dysfunction of lumbar region   M99.01 (ICD-10-CM) - Segmental and somatic dysfunction of cervical region   M99.08 (ICD-10-CM) - Segmental and somatic dysfunction of rib cage   M99.02 (ICD-10-CM) - Segmental and somatic dysfunction of thoracic region   G56.80 (ICD-10-CM) - Other specified mononeuropathies of unspecified upper limb   Rehab Codes: M62.81 (Muscle Weakness), M62.9 (Disorder of Muscle), M79.1 (Myalgia), Z73.6   Onset Date:                                Next 's appt.:  5 weeks from last visit recommended     Visit# / total visits:      Cancels/No Shows: 0/0      Subjective:    Pain:  [] Yes  [x] No Location: left side   Pain Rating: (0-10 scale) 0/10  Pain altered Tx:  [x] No  [] Yes  Action:  Comments: Patient arrived stating improved symptoms upon arrival. Mild soreness from last visit but the cable column exercises felt good. Has his appointment with Dr. Johnson on Tuesday.       Objective:  Precautions: Standard  Exercise     Midback pain  Reps/ Time Weight/ Level Comments             Bike UE  warm-up                 Upper Trap Stretch  30\"x3   HEP   Levator Scap Stretch  30\"x3   HEP   Shoulder Retractions  30\"x3   HEP   Corner Pec Stretch  30\"x3   HEP   Doorway Y stretch  30\"x3        Pec roller supine stretch   30\"x3             Cables         Retraction  x20 84 lbs  2 handles, 1 cable   Ext  x20 60 lbs  Rope, 1 cable   ER  x20 24 lbs  1 handle, 1 cable   IR  x20 48 lbs  1 handle, 1 cable   HAB  x20 24 lbs  1 handle, 1 cable             Prone program         Row  x20  10# HEP   Extension  x20

## 2025-05-22 ENCOUNTER — HOSPITAL ENCOUNTER (OUTPATIENT)
Dept: PHYSICAL THERAPY | Facility: CLINIC | Age: 25
Setting detail: THERAPIES SERIES
Discharge: HOME | End: 2025-05-22
Payer: COMMERCIAL

## 2025-05-22 PROCEDURE — 97110 THERAPEUTIC EXERCISES: CPT

## 2025-05-22 NOTE — FLOWSHEET NOTE
established HEP  [x]  []           Comprehension of Education:         Verbalizes Understanding  [x]  []      Demonstrates understanding [x]  []      Needs review [x]  []      Demonstrates/verbalizes HEP/Ed previously given [x]  []            Specific education given  [x]  []  HEP           [facility] Program for HEP Given [x]  []  Access Code:   [email or web address]      [employer] updated as per exercise log today  []  [x]                 Access Code: [email or web address]  URL: [email or web address]  Date: 04/10/2025  Prepared by: Jak Caldera    Exercises  - Seated Scapular Retraction  - 10 x daily - 7 x weekly - 10 reps - 5 (sec) hold  - Seated Upper Trapezius Stretch  - 1 x daily - 7 x weekly - 3 sets - 30 (sec) hold  - Seated Levator Scapulae Stretch  - 1 x daily - 7 x weekly - 3 sets - 30 (sec) hold  - Doorway Pec Stretch at 90 Degrees Abduction  - 1 x daily - 7 x weekly - 3 sets - 30 (sec) hold  - Doorway Pec Stretch at 120 Degrees Abduction  - 1 x daily - 7 x weekly - 3 sets - 30 (sec) hold  - Doorway Pec Stretch at 60 Degrees Abduction with Arm Straight  - 1 x daily - 7 x weekly - 3 sets - 30 (sec) hold  - Prone Depression   - 1 x daily - 7 x weekly - 3 sets - 10 reps  - Prone Retraction   - 1 x daily - 7 x weekly - 3 sets - 10 reps  - Scapular Retraction with Resistance  - 1 x daily - 7 x weekly - 3 sets - 10 reps  - Shoulder Extension with Resistance  - 1 x daily - 7 x weekly - 3 sets - 10 reps  - Shoulder External Rotation with Anchored Resistance  - 1 x daily - 7 x weekly - 3 sets - 10 reps  - Shoulder Internal Rotation with Resistance  - 1 x daily - 7 x weekly - 3 sets - 10 reps  - Standing Shoulder Flexion to 90 Degrees with Dumbbells  - 1 x daily - 7 x weekly - 3 sets - 10 reps  - Shoulder Abduction with Dumbbells - Thumbs Up  - 1 x daily - 7 x weekly - 3 sets - 10 reps  - Scaption with Dumbbells  - 1 x daily - 7 x weekly - 3 sets - 10 reps  - Sidelying Shoulder External Rotation  - 1 x daily - 7 x weekly - 3 sets - 10

## 2025-05-29 ENCOUNTER — HOSPITAL ENCOUNTER (OUTPATIENT)
Dept: PHYSICAL THERAPY | Facility: CLINIC | Age: 25
Setting detail: THERAPIES SERIES
Discharge: HOME | End: 2025-05-29
Payer: COMMERCIAL

## 2025-05-29 PROCEDURE — 97110 THERAPEUTIC EXERCISES: CPT

## 2025-05-29 NOTE — FLOWSHEET NOTE
[facility]  Outpatient Rehabilitation &  Therapy  [address]   [address]   P: [phone]  F: [phone]      Physical Therapy Daily Treatment Note    Date:  2025  Patient Name:  Isaac Us \"May-\"   :  2000  MRN: [id number]  Physician: Juan Johnson DO  Insurance: [facility] (30 v)     Medical Diagnosis:   M99.03 (ICD-10-CM) - Segmental and somatic dysfunction of lumbar region   M99.01 (ICD-10-CM) - Segmental and somatic dysfunction of cervical region   M99.08 (ICD-10-CM) - Segmental and somatic dysfunction of rib cage   M99.02 (ICD-10-CM) - Segmental and somatic dysfunction of thoracic region   G56.80 (ICD-10-CM) - Other specified mononeuropathies of unspecified upper limb   Rehab Codes: M62.81 (Muscle Weakness), M62.9 (Disorder of Muscle), M79.1 (Myalgia), Z73.6   Onset Date:                                Next 's appt.:  25    Visit# / total visits:      Cancels/No Shows: 0/0      Subjective:    Pain:  [] Yes  [x] No Location: left side   Pain Rating: (0-10 scale) 0/10  Pain altered Tx:  [x] No  [] Yes  Action:  Comments: Patient arrived stating he saw his doctor who would like him to continue PT to increased strength and feels he's about 60% of the way there.     Objective:  Precautions: Standard  Exercise     Midback pain  Reps/ Time Weight/ Level Comments             Bike UE  warm-up                 Upper Trap Stretch  30\"x3   HEP   Levator Scap Stretch  30\"x3   HEP   Shoulder Retractions  30\"x3   HEP   Corner Pec Stretch  30\"x3   HEP   Doorway Y stretch  30\"x3        Pec roller supine stretch   30\"x3             Cables         Retraction  x20 84 lbs  2 handles, 1 cable   Ext  x20 60 lbs  Rope, 1 cable   ER  x20 24 lbs  1 handle, 1 cable   IR  x20 48 lbs  1 handle, 1 cable   HAB  x20 24 lbs  1 handle, 1 cable             Prone program         Row  x20  10# HEP   Extension  x20  10#    HAB  x20   5#     Scaption  x20   5#               Standing

## 2025-06-05 ENCOUNTER — HOSPITAL ENCOUNTER (OUTPATIENT)
Dept: PHYSICAL THERAPY | Facility: CLINIC | Age: 25
Setting detail: THERAPIES SERIES
Discharge: HOME | End: 2025-06-05
Payer: COMMERCIAL

## 2025-06-05 PROCEDURE — 97110 THERAPEUTIC EXERCISES: CPT

## 2025-06-05 NOTE — FLOWSHEET NOTE
understanding [x]  []      Needs review [x]  []      Demonstrates/verbalizes HEP/Ed previously given [x]  []            Specific education given  [x]  []  HEP           [employer] Program for HEP Given [x]  []  Access Code:   [email or web address]      [employer] updated as per exercise log today  []  [x]                 Access Code: [email or web address]  URL: [email or web address]  Date: 04/10/2025  Prepared by: Jak Caldera    Exercises  - Seated Scapular Retraction  - 10 x daily - 7 x weekly - 10 reps - 5 (sec) hold  - Seated Upper Trapezius Stretch  - 1 x daily - 7 x weekly - 3 sets - 30 (sec) hold  - Seated Levator Scapulae Stretch  - 1 x daily - 7 x weekly - 3 sets - 30 (sec) hold  - Doorway Pec Stretch at 90 Degrees Abduction  - 1 x daily - 7 x weekly - 3 sets - 30 (sec) hold  - Doorway Pec Stretch at 120 Degrees Abduction  - 1 x daily - 7 x weekly - 3 sets - 30 (sec) hold  - Doorway Pec Stretch at 60 Degrees Abduction with Arm Straight  - 1 x daily - 7 x weekly - 3 sets - 30 (sec) hold  - Prone Depression   - 1 x daily - 7 x weekly - 3 sets - 10 reps  - Prone Retraction   - 1 x daily - 7 x weekly - 3 sets - 10 reps  - Scapular Retraction with Resistance  - 1 x daily - 7 x weekly - 3 sets - 10 reps  - Shoulder Extension with Resistance  - 1 x daily - 7 x weekly - 3 sets - 10 reps  - Shoulder External Rotation with Anchored Resistance  - 1 x daily - 7 x weekly - 3 sets - 10 reps  - Shoulder Internal Rotation with Resistance  - 1 x daily - 7 x weekly - 3 sets - 10 reps  - Standing Shoulder Flexion to 90 Degrees with Dumbbells  - 1 x daily - 7 x weekly - 3 sets - 10 reps  - Shoulder Abduction with Dumbbells - Thumbs Up  - 1 x daily - 7 x weekly - 3 sets - 10 reps  - Scaption with Dumbbells  - 1 x daily - 7 x weekly - 3 sets - 10 reps  - Sidelying Shoulder External Rotation  - 1 x daily - 7 x weekly - 3 sets - 10 reps  - Supine Scapular Protraction in Flexion with Dumbbells  - 1 x daily - 7 x weekly - 3 sets - 10 reps  - Supine

## 2025-06-12 ENCOUNTER — HOSPITAL ENCOUNTER (OUTPATIENT)
Dept: PHYSICAL THERAPY | Facility: CLINIC | Age: 25
Setting detail: THERAPIES SERIES
Discharge: HOME | End: 2025-06-12
Payer: COMMERCIAL

## 2025-06-12 PROCEDURE — 97110 THERAPEUTIC EXERCISES: CPT

## 2025-06-12 NOTE — FLOWSHEET NOTE
weekly - 3 sets    Plan: [x] Continue current frequency toward long and short term goals.        Time In: 1600         Time Out: 1650    Electronically signed by:  Jak Caldera PT

## 2025-06-19 ENCOUNTER — HOSPITAL ENCOUNTER (OUTPATIENT)
Dept: PHYSICAL THERAPY | Facility: CLINIC | Age: 25
Setting detail: THERAPIES SERIES
Discharge: HOME | End: 2025-06-19
Payer: COMMERCIAL

## 2025-06-19 PROCEDURE — 97110 THERAPEUTIC EXERCISES: CPT

## 2025-06-19 NOTE — FLOWSHEET NOTE
[facility]  Outpatient Rehabilitation &  Therapy  [address]   [address]   P: [phone]  F: [phone]      Physical Therapy Daily Treatment Note    Date:  2025  Patient Name:  Isaac Us \"May-\"   :  2000  MRN: [id number]  Physician: Juan Johnson DO  Insurance: [facility] (30 v)     Medical Diagnosis:   M99.03 (ICD-10-CM) - Segmental and somatic dysfunction of lumbar region   M99.01 (ICD-10-CM) - Segmental and somatic dysfunction of cervical region   M99.08 (ICD-10-CM) - Segmental and somatic dysfunction of rib cage   M99.02 (ICD-10-CM) - Segmental and somatic dysfunction of thoracic region   G56.80 (ICD-10-CM) - Other specified mononeuropathies of unspecified upper limb   Rehab Codes: M62.81 (Muscle Weakness), M62.9 (Disorder of Muscle), M79.1 (Myalgia), Z73.6   Onset Date:                                Next 's appt.:      Visit# / total visits:      Cancels/No Shows: 0/0      Subjective:    Pain:  [] Yes  [x] No Location: left side   Pain Rating: (0-10 scale) 0/10  Pain altered Tx:  [x] No  [] Yes  Action:  Comments: Patient reports no pain or soreness this date.      Objective:  Precautions: Standard  Exercise     Midback pain  Reps/ Time Weight/ Level Comments             Bike UE  warm-up                 Corner Pec Stretch  30\"x3   HEP   Doorway Y stretch  30\"x3              Cables         Retraction  x20 84 lbs  2 handles, 1 cable   Ext  x20 84 lbs  Rope, 1 cable   ER  x20 24 lbs  1 handle, 1 cable   IR  x20 48 lbs  1 handle, 1 cable   HAB  x20 24 lbs  1 handle, 1 cable             Prone Bench         Row  x20  10# HEP   Extension  x20  10#    HAB  x20   5#     Scaption  x20   5#     Chest flies  x20  10#     Bench press  x20  10#    SA punches   x20  10#    ABC's  x1  10#          Standing      Flexion x20  9#    Scaption x20  9#    Abduction x20  9#    Shoulder press x20  9#          Turf      Overhead Carry 3 laps  9# L UE   [place]  x20

## 2025-06-25 ENCOUNTER — HOSPITAL ENCOUNTER (OUTPATIENT)
Dept: PHYSICAL THERAPY | Facility: CLINIC | Age: 25
Setting detail: THERAPIES SERIES
Discharge: HOME | End: 2025-06-25
Payer: COMMERCIAL

## 2025-06-25 PROCEDURE — 97112 NEUROMUSCULAR REEDUCATION: CPT

## 2025-06-25 PROCEDURE — 97140 MANUAL THERAPY 1/> REGIONS: CPT

## 2025-06-25 PROCEDURE — 97164 PT RE-EVAL EST PLAN CARE: CPT

## 2025-06-25 NOTE — PROGRESS NOTES
[x] [facility]  Outpatient Rehabilitation &  Therapy  [address]  P: [phone]  F: [phone]    Physical Therapy Daily Treatment Note/Progress Note    Date:  2025  Patient Name:  Isaac Us \"May-zenon\"   :  2000  MRN: [id number]  Physician: Juan Johnson DO  Insurance: [facility] (30 v)     Medical Diagnosis:   M99.03 (ICD-10-CM) - Segmental and somatic dysfunction of lumbar region   M99.01 (ICD-10-CM) - Segmental and somatic dysfunction of cervical region   M99.08 (ICD-10-CM) - Segmental and somatic dysfunction of rib cage   M99.02 (ICD-10-CM) - Segmental and somatic dysfunction of thoracic region   G56.80 (ICD-10-CM) - Other specified mononeuropathies of unspecified upper limb   Rehab Codes: M62.81 (Muscle Weakness), M62.9 (Disorder of Muscle), M79.1 (Myalgia), Z73.6   Onset Date:                                Next 's appt.:  prn    Visit# / total visits: 15/20     Cancels/No Shows: 0/0      Subjective:    Pain:  [] Yes  [x] No Location: left side   Pain Rating: (0-10 scale) 3-7/10  Pain altered Tx:  [x] No  [] Yes  Action:  Comments: Patient transferring from [facility] this date. He reports complaint of daily pain while doing activities such as sitting longer than 20 minutes at a computer, putting a blanket over his shoulders while sitting, turning and side bending his neck or back as well as wearing a button down shirt for an hour. His pain is achy near his L shoulder blade and can be sharp when he moves in the directions as described above along with lifting or reaching in front or overhead with his L UE. He wants to get stronger and feel like he can go back to working out in the gym without fear of injuring himself again.     Objective: Pt presents with poor posture, winging R scap > L (pt carries back pack on his R side) forward head, increased kyphosis, and slumped sitting w/rounded shoulders. T4-5 FRSL and L post ribs at T4-5. Increased

## 2025-06-26 ENCOUNTER — HOSPITAL ENCOUNTER (OUTPATIENT)
Dept: PHYSICAL THERAPY | Facility: CLINIC | Age: 25
Setting detail: THERAPIES SERIES
Discharge: HOME | End: 2025-06-26
Payer: COMMERCIAL

## 2025-06-26 PROCEDURE — 97110 THERAPEUTIC EXERCISES: CPT

## 2025-06-26 NOTE — FLOWSHEET NOTE
[facility]  Outpatient Rehabilitation &  Therapy  [address]   [address]   P: [phone]  F: [phone]      Physical Therapy Daily Treatment Note    Date:  2025  Patient Name:  Isaac Us \"May-\"   :  2000  MRN: [id number]  Physician: Juan Johnson DO  Insurance: [facility] (30 v)     Medical Diagnosis:   M99.03 (ICD-10-CM) - Segmental and somatic dysfunction of lumbar region   M99.01 (ICD-10-CM) - Segmental and somatic dysfunction of cervical region   M99.08 (ICD-10-CM) - Segmental and somatic dysfunction of rib cage   M99.02 (ICD-10-CM) - Segmental and somatic dysfunction of thoracic region   G56.80 (ICD-10-CM) - Other specified mononeuropathies of unspecified upper limb   Rehab Codes: M62.81 (Muscle Weakness), M62.9 (Disorder of Muscle), M79.1 (Myalgia), Z73.6   Onset Date:                                Next 's appt.:      Visit# / total visits: 15/20     Cancels/No Shows: 0/0      Subjective:    Pain:  [] Yes  [x] No Location: left side   Pain Rating: (0-10 scale) 0/10  Pain altered Tx:  [x] No  [] Yes  Action:  Comments: Pt stated that he has no complaints or issues today.     Objective:  Precautions: Standard  Exercise     Midback pain  Reps/ Time Weight/ Level Comments    [place]  warm-up     x   Doorway T and Y stretch  30\"x3      x                     Prone Bench bilateral           Row to kick back 2x15 5#  x   HAB 2x15 5#   x   Row to ER 2x10 5#  x          SA punches 3-way  10#                    Side lying bilateral        ER 2x10 5#  x   HAB 2x10 5#  x   ABD 2x10 5#  x          Low trap pull downs 2x10   x   Lat pull down wide 2x10      Staight arm pull down 2x10                                 Next Session: emphasis on posture/positioning with ex progression for core and scapular strengthening (especially B serratus anteriors, scap depressors, lats and mid trap and lower traps)      Treatment Charges: Mins Units   Ther Exercise 50 3   Manual  no tenderness bilaterally

## 2025-07-01 ENCOUNTER — HOSPITAL ENCOUNTER (OUTPATIENT)
Dept: PHYSICAL THERAPY | Facility: CLINIC | Age: 25
Setting detail: THERAPIES SERIES
Discharge: HOME | End: 2025-07-01
Payer: COMMERCIAL

## 2025-07-01 PROCEDURE — 97110 THERAPEUTIC EXERCISES: CPT

## 2025-07-01 NOTE — FLOWSHEET NOTE
[facility]  Outpatient Rehabilitation &  Therapy  [address]   [address]   P: [phone]  F: [phone]      Physical Therapy Daily Treatment Note    Date:  2025  Patient Name:  Isaac Us \"May-\"   :  2000  MRN: [id number]  Physician: Juan Johnson DO  Insurance: [facility] (30 v)     Medical Diagnosis:   M99.03 (ICD-10-CM) - Segmental and somatic dysfunction of lumbar region   M99.01 (ICD-10-CM) - Segmental and somatic dysfunction of cervical region   M99.08 (ICD-10-CM) - Segmental and somatic dysfunction of rib cage   M99.02 (ICD-10-CM) - Segmental and somatic dysfunction of thoracic region   G56.80 (ICD-10-CM) - Other specified mononeuropathies of unspecified upper limb   Rehab Codes: M62.81 (Muscle Weakness), M62.9 (Disorder of Muscle), M79.1 (Myalgia), Z73.6   Onset Date:                                Next 's appt.:      Visit# / total visits:      Cancels/No Shows: 0/0      Subjective:    Pain:  [] Yes  [x] No Location: left side   Pain Rating: (0-10 scale) 0/10  Pain altered Tx:  [x] No  [] Yes  Action:  Comments: Pt mentioned that he felt really good after last session and that he likes the exercises that he was provided.     Objective:  Precautions: Standard  Exercise     Midback pain  Reps/ Time Weight/ Level Comments    Airdyne  warm-up     x   Doorway T and Y stretch  30\"x3      x                     Prone Bench bilateral           Row to kick back 2x15 5#  x   HAB 2x15 5#   x   Row to ER 2x10 5#  x          Side lying bilateral        ER 2x10 5#  x   HAB 2x10 5#  x   ABD 2x10 5#  x          Low trap pull downs 20x3\" 80#  x   Lat pull down wide 2x10 70#  x   Staight arm pull down 2x10 30#  x                              Next Session: emphasis on posture/positioning with ex progression for core and scapular strengthening (especially B serratus anteriors, scap depressors, lats and mid trap and lower traps)      Treatment Charges: Mins Units

## 2025-07-03 ENCOUNTER — HOSPITAL ENCOUNTER (OUTPATIENT)
Dept: PHYSICAL THERAPY | Facility: CLINIC | Age: 25
Setting detail: THERAPIES SERIES
Discharge: HOME | End: 2025-07-03
Payer: COMMERCIAL

## 2025-07-03 PROCEDURE — 97110 THERAPEUTIC EXERCISES: CPT

## 2025-07-03 NOTE — FLOWSHEET NOTE
Therapy     Vasocompression          Total Billable time 50 3       Assessment: [x] Progressing toward goals. Able to increase reps for all sidelying exercises this date, with cueing needed for last 5 reps for proper form due to fatigue, with good carryover after cues. No increase in symptoms noted post treatment. Continue to progress strength with proper scapular position and form.     [] No change.     [] Other:  [x] Patient would benefit from skilled physical therapy services in order to improve ROM, flexibility, strength and decrease pain. Patient with limitations as noted in the objective section of the eval above. Plan to address limitations in flexibility, strength and somatic dysfunctions.        STG/LTG:  Goals  MET NOT MET ON-  GOING  Details   Date Addressed:            STG: To be met in 10 treatments            1. ? Pain: Decrease pain levels to 3/10 with ADLs []  []  [x]      2. ? ROM: Increase flexibility limitations throughout to equal bilat to reduce difficulty with ADLs []  []  [x]      3. ? Strength: Increase MMT to 5/5 throughout to ease functional limitations and mobility  []  []  [x]      4. Independent with Home Exercise Programs []  []  [x]        []  []  []      Date Addressed:            LTG: To be met in 20 treatments           1. Improve score on assessment tool Oswestry from 26% impairment to less than 10% impairment  []  [x]  []      2. Reduce pain levels to 1/10 or less with ADLs []  [x]  []      3. Pt able to do push ups without increased pain []  []  []  New goal 6/25/25    4.. Return to previous workout activities without increased pain  []  []   []    New goal 6/25/25    5. Independent with holding proper posture/positioning with ADLs without cues []  []  []  New goal 6/25/25          Patient goals: decrease pain; return to working out/premorbid activities        Patient goals: decrease pain            Education      Yes No    Patient Education Provided today  [x]  []     Reviewed

## 2025-07-08 ENCOUNTER — HOSPITAL ENCOUNTER (OUTPATIENT)
Dept: PHYSICAL THERAPY | Facility: CLINIC | Age: 25
Setting detail: THERAPIES SERIES
Discharge: HOME | End: 2025-07-08
Payer: COMMERCIAL

## 2025-07-08 PROCEDURE — 97110 THERAPEUTIC EXERCISES: CPT

## 2025-07-08 NOTE — FLOWSHEET NOTE
[facility]  Outpatient Rehabilitation &  Therapy  [address]   [address]   P: [phone]  F: [phone]      Physical Therapy Daily Treatment Note    Date:  2025  Patient Name:  Isaac Us \"May-\"   :  2000  MRN: [id number]  Physician: Juan Johnson DO  Insurance: [facility] (30 v)     Medical Diagnosis:   M99.03 (ICD-10-CM) - Segmental and somatic dysfunction of lumbar region   M99.01 (ICD-10-CM) - Segmental and somatic dysfunction of cervical region   M99.08 (ICD-10-CM) - Segmental and somatic dysfunction of rib cage   M99.02 (ICD-10-CM) - Segmental and somatic dysfunction of thoracic region   G56.80 (ICD-10-CM) - Other specified mononeuropathies of unspecified upper limb   Rehab Codes: M62.81 (Muscle Weakness), M62.9 (Disorder of Muscle), M79.1 (Myalgia), Z73.6   Onset Date:                                Next 's appt.:      Visit# / total visits:      Cancels/No Shows: 0/0      Subjective:    Pain:  [] Yes  [x] No Location: left side   Pain Rating: (0-10 scale) 0/10  Pain altered Tx:  [x] No  [] Yes  Action:  Comments: Noticing more improvement since transferring locations and changing program    Objective:  Precautions: Standard  Exercise     Midback pain  Reps/ Time Weight/ Level Comments    Airdyne  5m     x   Doorway T and Y stretch  30\"x3      x                     Prone Bench bilateral           Row to kick back 20x 6#  x   HAB 2x10 6#   x   Row to ER 2x10 6#  x          Side lying bilateral        ER 2x15 5#  x   HAB 2x15 5#  x   ABD 2x15 5#  x          Low trap pull downs 20x3\" 80#  x   Lat pull down wide 2x10 50#  x   Staight arm pull down 2x10 30#  x                              Next Session: emphasis on posture/positioning with ex progression for core and scapular strengthening (especially B serratus anteriors, scap depressors, lats and mid trap and lower traps)      Treatment Charges: Mins Units   Ther Exercise 50 3   Manual Therapy

## 2025-07-10 ENCOUNTER — HOSPITAL ENCOUNTER (OUTPATIENT)
Dept: PHYSICAL THERAPY | Facility: CLINIC | Age: 25
Setting detail: THERAPIES SERIES
Discharge: HOME | End: 2025-07-10
Payer: COMMERCIAL

## 2025-07-10 PROCEDURE — 97110 THERAPEUTIC EXERCISES: CPT

## 2025-07-10 NOTE — FLOWSHEET NOTE
[facility]  Outpatient Rehabilitation &  Therapy  [address]   [address]   P: [phone]  F: [phone]      Physical Therapy Daily Treatment Note    Date:  7/10/2025  Patient Name:  Isaac Us \"May-\"   :  2000  MRN: [id number]  Physician: Juan Johnson DO  Insurance: [facility] (30 v)     Medical Diagnosis:   M99.03 (ICD-10-CM) - Segmental and somatic dysfunction of lumbar region   M99.01 (ICD-10-CM) - Segmental and somatic dysfunction of cervical region   M99.08 (ICD-10-CM) - Segmental and somatic dysfunction of rib cage   M99.02 (ICD-10-CM) - Segmental and somatic dysfunction of thoracic region   G56.80 (ICD-10-CM) - Other specified mononeuropathies of unspecified upper limb   Rehab Codes: M62.81 (Muscle Weakness), M62.9 (Disorder of Muscle), M79.1 (Myalgia), Z73.6   Onset Date:                                Next 's appt.:      Visit# / total visits:      Cancels/No Shows: 0/0      Subjective:    Pain:  [] Yes  [x] No Location: left side   Pain Rating: (0-10 scale) 0/10  Pain altered Tx:  [x] No  [] Yes  Action:  Comments:  Pt mentioned that he is working on these exercises in a gym routine.    Objective:  Precautions: Standard  Exercise     Midback pain  Reps/ Time Weight/ Level Comments    Angela  5m     x   Doorway T and Y stretch  30\"x3      x          Prone Bench bilateral           Row to kick back 20x 6#  x   HAB 2x10 6#  Palms facing and supination x   Row to ER 2x10 6#  x          Side lying bilateral        ER 2x15 5#  x   HAB 2x15 5#  x   Flexion  2x10 5#  x          Low trap pull downs 20x3\" 80#  x   Lat pull down wide 2x10 50#  x   Staight arm pull down 2x10 30#  x                              Next Session: emphasis on posture/positioning with ex progression for core and scapular strengthening (especially B serratus anteriors, scap depressors, lats and mid trap and lower traps)      Treatment Charges: Mins Units   Ther Exercise 50 3

## 2025-07-15 ENCOUNTER — HOSPITAL ENCOUNTER (OUTPATIENT)
Dept: PHYSICAL THERAPY | Facility: CLINIC | Age: 25
Setting detail: THERAPIES SERIES
Discharge: HOME | End: 2025-07-15
Payer: COMMERCIAL

## 2025-07-15 PROCEDURE — 97110 THERAPEUTIC EXERCISES: CPT

## 2025-07-15 NOTE — FLOWSHEET NOTE
[facility]  Outpatient Rehabilitation &  Therapy  [address]   [address]   P: [phone]  F: [phone]      Physical Therapy Daily Treatment Note    Date:  7/15/2025  Patient Name:  Isaac Us \"May-\"   :  2000  MRN: [id number]  Physician: Juan Johnson DO  Insurance: [facility] (30 v)     Medical Diagnosis:   M99.03 (ICD-10-CM) - Segmental and somatic dysfunction of lumbar region   M99.01 (ICD-10-CM) - Segmental and somatic dysfunction of cervical region   M99.08 (ICD-10-CM) - Segmental and somatic dysfunction of rib cage   M99.02 (ICD-10-CM) - Segmental and somatic dysfunction of thoracic region   G56.80 (ICD-10-CM) - Other specified mononeuropathies of unspecified upper limb   Rehab Codes: M62.81 (Muscle Weakness), M62.9 (Disorder of Muscle), M79.1 (Myalgia), Z73.6   Onset Date:                                Next 's appt.:      Visit# / total visits:      Cancels/No Shows: 0/0      Subjective:    Pain:  [] Yes  [x] No Location: left side   Pain Rating: (0-10 scale) 0/10  Pain altered Tx:  [x] No  [] Yes  Action:  Comments:  Pt with no issues to speak on today.     Objective:  Precautions: Standard  Exercise     Midback pain  Reps/ Time Weight/ Level Comments    Airdyne  10m         Doorway T and Y stretch  30\"x3                Prone Bench bilateral           Row to kick back 25x 6#     HAB 25x 6#  Palms facing and supination    Row to ER 25x 6#     Y's 20x 6#            Side lying bilateral        ER 2x15 5#     HAB 2x15 5#     Flexion  2x10 5#            Low trap pull downs 20x3\" 90#     Lat pull down wide 2x10 50#     Staight arm pull down 2x10 35#                                Next Session: emphasis on posture/positioning with ex progression for core and scapular strengthening (especially B serratus anteriors, scap depressors, lats and mid trap and lower traps)      Treatment Charges: Mins Units   Ther Exercise 50 3   Manual Therapy

## 2025-07-17 ENCOUNTER — HOSPITAL ENCOUNTER (OUTPATIENT)
Dept: PHYSICAL THERAPY | Facility: CLINIC | Age: 25
Setting detail: THERAPIES SERIES
Discharge: HOME | End: 2025-07-17
Payer: COMMERCIAL

## 2025-07-17 PROCEDURE — 97110 THERAPEUTIC EXERCISES: CPT

## 2025-07-17 NOTE — FLOWSHEET NOTE
[facility]  Outpatient Rehabilitation &  Therapy  [address]   [address]   P: [phone]  F: [phone]      Physical Therapy Daily Treatment Note    Date:  2025  Patient Name:  Isaac Us \"May-\"   :  2000  MRN: [id number]  Physician: Juan Johnson DO  Insurance: [facility] (30 v)     Medical Diagnosis:   M99.03 (ICD-10-CM) - Segmental and somatic dysfunction of lumbar region   M99.01 (ICD-10-CM) - Segmental and somatic dysfunction of cervical region   M99.08 (ICD-10-CM) - Segmental and somatic dysfunction of rib cage   M99.02 (ICD-10-CM) - Segmental and somatic dysfunction of thoracic region   G56.80 (ICD-10-CM) - Other specified mononeuropathies of unspecified upper limb   Rehab Codes: M62.81 (Muscle Weakness), M62.9 (Disorder of Muscle), M79.1 (Myalgia), Z73.6   Onset Date:                                Next 's appt.:      Visit# / total visits:      Cancels/No Shows: 0/0      Subjective:    Pain:  [] Yes  [x] No Location: left side   Pain Rating: (0-10 scale) 0/10  Pain altered Tx:  [x] No  [] Yes  Action:  Comments:  Pt having no pain or complaints today.     Objective:  Precautions: Standard  Exercise     Midback pain  Reps/ Time Weight/ Level Comments    Airdyne  10m     x   Doorway T and Y stretch  30\"x3      x          Prone Bench bilateral           Row to kick back 25x 6#     HAB 25x 6#  Palms facing and supination    Row to ER 25x 6#  x   Y's 20x 6#  x          Side lying bilateral        ER 2x15 5#  x   HAB 2x15 5#  x   ABD 2x15 5#  x   Flexion  2x10 5#  x          Low trap pull downs 20x3\" 90#     Lat pull down wide 2x10 50#     Staight arm pull down 2x10 35#     Lat pull eccentrics 20x 120#  x   Low plinth push ups 2x fatigue   x                Next Session: emphasis on posture/positioning with ex progression for core and scapular strengthening (especially B serratus anteriors, scap depressors, lats and mid trap and lower traps)

## 2025-07-22 ENCOUNTER — HOSPITAL ENCOUNTER (OUTPATIENT)
Dept: PHYSICAL THERAPY | Facility: CLINIC | Age: 25
Setting detail: THERAPIES SERIES
Discharge: HOME | End: 2025-07-22
Payer: COMMERCIAL

## 2025-07-22 PROCEDURE — 97110 THERAPEUTIC EXERCISES: CPT

## 2025-07-22 NOTE — FLOWSHEET NOTE
[facility]  Outpatient Rehabilitation &  Therapy  [address]   [address]   P: [phone]  F: [phone]      Physical Therapy Daily Treatment Note    Date:  2025  Patient Name:  Isaac Us \"May-\"   :  2000  MRN: [id number]  Physician: Juan Johnson DO  Insurance: [facility] (30 v)     Medical Diagnosis:   M99.03 (ICD-10-CM) - Segmental and somatic dysfunction of lumbar region   M99.01 (ICD-10-CM) - Segmental and somatic dysfunction of cervical region   M99.08 (ICD-10-CM) - Segmental and somatic dysfunction of rib cage   M99.02 (ICD-10-CM) - Segmental and somatic dysfunction of thoracic region   G56.80 (ICD-10-CM) - Other specified mononeuropathies of unspecified upper limb   Rehab Codes: M62.81 (Muscle Weakness), M62.9 (Disorder of Muscle), M79.1 (Myalgia), Z73.6   Onset Date:                                Next 's appt.:  PRN    Visit# / total visits:      Cancels/No Shows: 0/0      Subjective:    Pain:  [] Yes  [x] No Location: left side   Pain Rating: (0-10 scale) 0/10  Pain altered Tx:  [x] No  [] Yes  Action:  Comments:  Pt arrived 10 min late but able to accommodate. Pt with no complaints of pain or soreness today.      Objective:  Precautions: Standard  Exercise     Midback pain  Reps/ Time Weight/ Level Comments    Angela  5m     x   Doorway T and Y stretch  30\"x3      x          Prone Bench bilateral           Row to kick back 25x 6#  x   HAB 25x 6#  Palms facing and supination x   Row to ER 25x 6#  x   Y's 20x 6#  x          Side lying bilateral        ER 2x15 5#     HAB 2x15 5#     ABD 2x15 5#     Flexion  2x10 5#            Low trap pull downs 20x3\" 90#  x   Lat pull down wide 2x10 50#     Staight arm pull down 3x10 40#  x   Lat pull eccentrics 20x 120#     Low plinth push ups 2x fatigue   x                Next Session: emphasis on posture/positioning with ex progression for core and scapular strengthening (especially B serratus anteriors,

## 2025-07-24 ENCOUNTER — HOSPITAL ENCOUNTER (OUTPATIENT)
Dept: PHYSICAL THERAPY | Facility: CLINIC | Age: 25
Setting detail: THERAPIES SERIES
Discharge: HOME | End: 2025-07-24
Payer: COMMERCIAL

## 2025-07-24 PROCEDURE — 97140 MANUAL THERAPY 1/> REGIONS: CPT

## 2025-07-24 PROCEDURE — 97124 MASSAGE THERAPY: CPT

## 2025-07-24 PROCEDURE — 97110 THERAPEUTIC EXERCISES: CPT

## 2025-07-24 NOTE — FLOWSHEET NOTE
[facility]  Outpatient Rehabilitation &  Therapy  [address]   [address]   P: [phone]  F: [phone]      Physical Therapy Daily Treatment Note    Date:  2025  Patient Name:  Isaac Us \"May-\"   :  2000  MRN: [id number]  Physician: Juan Johnson DO  Insurance: [facility] (30 v)     Medical Diagnosis:   M99.03 (ICD-10-CM) - Segmental and somatic dysfunction of lumbar region   M99.01 (ICD-10-CM) - Segmental and somatic dysfunction of cervical region   M99.08 (ICD-10-CM) - Segmental and somatic dysfunction of rib cage   M99.02 (ICD-10-CM) - Segmental and somatic dysfunction of thoracic region   G56.80 (ICD-10-CM) - Other specified mononeuropathies of unspecified upper limb   Rehab Codes: M62.81 (Muscle Weakness), M62.9 (Disorder of Muscle), M79.1 (Myalgia), Z73.6   Onset Date:                                Next 's appt.:  PRN    Visit# / total visits:      Cancels/No Shows: 0/0      Subjective:    Pain:  [] Yes  [x] No Location: left side   Pain Rating: (0-10 scale) 0/10  Pain altered Tx:  [x] No  [] Yes  Action:  Comments:  Pt  mentioned putting on a tight shirt earlier and feeling a lot of pulling pain again in his upper back. Changed to a looser shirt and it felt a little better.      Objective:  Precautions: Standard  Exercise     Midback pain  Reps/ Time Weight/ Level Comments    Airdyne  5m     x   Doorway T and Y stretch  30\"x3      x   1/2 foam roll   Manual performed to L pec           Prone Bench bilateral           Row to kick back 25x 6#      HAB 25x 6#  Palms facing and supination    Row to ER 25x 6#     Y's 20x 6#            Side lying bilateral        ER 2x15 5#     HAB 2x15 5#     ABD 2x15 5#     Flexion  2x10 5#            Low trap pull downs 20x3\" 90#      Lat pull down wide 2x10 50#  x   Staight arm pull down 3x10 40#  x   Lat pull eccentrics 3x5 130#     push ups 3x fatigue   x                Next Session: emphasis on

## 2025-07-29 ENCOUNTER — APPOINTMENT (OUTPATIENT)
Dept: PHYSICAL THERAPY | Facility: CLINIC | Age: 25
End: 2025-07-29
Payer: COMMERCIAL

## 2025-07-31 ENCOUNTER — APPOINTMENT (OUTPATIENT)
Dept: PHYSICAL THERAPY | Facility: CLINIC | Age: 25
End: 2025-07-31
Payer: COMMERCIAL

## 2025-08-19 ENCOUNTER — HOSPITAL ENCOUNTER (OUTPATIENT)
Dept: PHYSICAL THERAPY | Facility: CLINIC | Age: 25
Setting detail: THERAPIES SERIES
Discharge: HOME | End: 2025-08-19

## 2025-08-28 ENCOUNTER — HOSPITAL ENCOUNTER (OUTPATIENT)
Dept: PHYSICAL THERAPY | Facility: CLINIC | Age: 25
Setting detail: THERAPIES SERIES
Discharge: HOME | End: 2025-08-28
Payer: COMMERCIAL

## 2025-08-28 PROCEDURE — 97110 THERAPEUTIC EXERCISES: CPT
